# Patient Record
Sex: MALE | Race: WHITE | NOT HISPANIC OR LATINO | Employment: OTHER | URBAN - METROPOLITAN AREA
[De-identification: names, ages, dates, MRNs, and addresses within clinical notes are randomized per-mention and may not be internally consistent; named-entity substitution may affect disease eponyms.]

---

## 2017-01-12 ENCOUNTER — TRANSFERRED RECORDS (OUTPATIENT)
Dept: HEALTH INFORMATION MANAGEMENT | Facility: CLINIC | Age: 27
End: 2017-01-12

## 2017-10-01 ENCOUNTER — TRANSFERRED RECORDS (OUTPATIENT)
Dept: HEALTH INFORMATION MANAGEMENT | Facility: CLINIC | Age: 27
End: 2017-10-01

## 2017-10-02 ENCOUNTER — TELEPHONE (OUTPATIENT)
Dept: GASTROENTEROLOGY | Facility: CLINIC | Age: 27
End: 2017-10-02

## 2017-10-02 NOTE — TELEPHONE ENCOUNTER
[facility] Call Center    Phone Message    Name of Caller: Abi    Phone Number: Cell number on file:    Telephone Information:        or Other phone number: [phone]    Best time to return call: ANY    May a detailed message be left on voicemail: yes    Relation to patient: Mother    Reason for Call: Other: Patient's mother called nad wanted to know which Dr. Dr. Stewart wanted patient to transition     Action Taken: Message routed to:  Pediatric Clinics: Gastroenterology (GI) p [id number]

## 2017-10-02 NOTE — TELEPHONE ENCOUNTER
Patient's mother was called back and informed that Dr. Stewart recommends Dr. Yossi Adkins #[phone].  Patient's mother verbalized understanding.  David Timmons RN

## 2017-11-01 ENCOUNTER — TRANSFERRED RECORDS (OUTPATIENT)
Dept: HEALTH INFORMATION MANAGEMENT | Facility: CLINIC | Age: 27
End: 2017-11-01

## 2018-06-18 ENCOUNTER — TELEPHONE (OUTPATIENT)
Dept: GASTROENTEROLOGY | Facility: CLINIC | Age: 28
End: 2018-06-18

## 2018-06-18 NOTE — TELEPHONE ENCOUNTER
[facility] Call Center    Phone Message: Abi  Phone: [phone]    May a detailed message be left on voicemail: yes    Reason for Call: Abi called and said Dr. Stewart had recommended GI Provider and is hoping to get the name the Provider for Manny.  Please advise.  Thank you.     Action Taken: Message routed to:  Pediatric Clinics: Gastroenterology (GI) p [id number]

## 2018-06-18 NOTE — TELEPHONE ENCOUNTER
Patient's mother was called back and provided scheduling line for Dr. Yossi Adkins at the [facility].  David Timmons RN

## 2018-08-22 ENCOUNTER — TELEPHONE (OUTPATIENT)
Dept: GASTROENTEROLOGY | Facility: CLINIC | Age: 28
End: 2018-08-22

## 2018-08-22 DIAGNOSIS — K51.80 OTHER ULCERATIVE COLITIS WITHOUT COMPLICATION (H): Primary | ICD-10-CM

## 2018-08-22 NOTE — TELEPHONE ENCOUNTER
Patient's mom reports patient has recently moved back to [place] from [place]. Is due for Remicade infusion in the next 1-2 weeks. Had previously been followed by Dr. Kavin Minor of [facility] in [place], [place]. Patient would like to do infusion at [facility] because they have weekend options; however, mom reports she was told that they do not take orders from out of state. She asked if Dr. Stewart would be willing to co-sign orders.     Discussed with Dr. Stewart who is willing to co-sign. Mom is working to get SAVANNA from patient sent to Dr. Minor's office. Plan to have recent clinic notes and Remicade infusion orders faxed here.

## 2018-08-28 NOTE — TELEPHONE ENCOUNTER
Clinic has only received an office visit note from Dr. Minor dated 11/1/17 and are still in need of the actual Remicade orders.  Patient's mother was called back and voicemail was left informing her that Remicade orders are still missing.  Patient's mother was instructed to have [facility] fax Remicade orders and if there have been any other recent office visits.  Direct clinic fax and phone number was provided.  David Timmons RN

## 2018-08-28 NOTE — TELEPHONE ENCOUNTER
Mom is calling to follow up on if records from Dr. Minor's office have arrived. Please advise. [phone]

## 2018-08-29 NOTE — TELEPHONE ENCOUNTER
[facility] Call Center    Phone Message    May a detailed message be left on voicemail: yes    Reason for Call: Other: Patients mother is following up on the status if Remicade orders were received. They were faxed yesterday at #: [phone]. Please advise.     Action Taken: Message routed to:  Pediatric Clinics: Gastroenterology (GI) p [phone]

## 2018-08-29 NOTE — TELEPHONE ENCOUNTER
Patient's mother was called back and informed that Remicade orders have not yet been received via fax.  Patient's mother will plan to call Dr. Minor's clinic again and suggest that the clinic call [facility] directly after orders are faxed to confirm receipt.  Direct clinic contact information was provided.  David Timmons RN

## 2018-09-05 VITALS — BODY MASS INDEX: 24.65 KG/M2 | WEIGHT: 218.8 LBS

## 2018-09-05 PROBLEM — K51.80 OTHER ULCERATIVE COLITIS WITHOUT COMPLICATION (H): Status: ACTIVE | Noted: 2018-09-05

## 2018-09-05 RX ORDER — ACETAMINOPHEN 325 MG/1
650 TABLET ORAL ONCE
Status: CANCELLED
Start: 2018-09-05 | End: 2018-09-05

## 2018-09-05 RX ORDER — DIPHENHYDRAMINE HCL 25 MG
25 CAPSULE ORAL ONCE
Status: CANCELLED
Start: 2018-09-05 | End: 2018-09-05

## 2018-09-05 NOTE — TELEPHONE ENCOUNTER
Patient's Remicade orders from [facility] were received via fax.  Remicade Therapy Plan was placed as instructed by Dr. Stewart via VORB to last until patient's consult with Dr. Adkins in October.  Patient's mother was called and updated.  She will contact patient and they will plan to schedule infusion at [facility].  Patient/mother will call back with other questions.  This RN reinforced that if needed, they should verify coverage with insurance since patient is changing infusion sites from [place] back to [place].  David Timmons RN

## 2018-09-15 ENCOUNTER — INFUSION THERAPY VISIT (OUTPATIENT)
Dept: INFUSION THERAPY | Facility: CLINIC | Age: 28
End: 2018-09-15
Attending: PEDIATRICS
Payer: COMMERCIAL

## 2018-09-15 ENCOUNTER — HOSPITAL ENCOUNTER (OUTPATIENT)
Facility: CLINIC | Age: 28
Setting detail: SPECIMEN
Discharge: HOME | End: 2018-09-15
Attending: PEDIATRICS | Admitting: PEDIATRICS
Payer: COMMERCIAL

## 2018-09-15 VITALS
BODY MASS INDEX: 27.35 KG/M2 | WEIGHT: 242.8 LBS | DIASTOLIC BLOOD PRESSURE: 68 MMHG | TEMPERATURE: 97.7 F | HEART RATE: 60 BPM | SYSTOLIC BLOOD PRESSURE: 118 MMHG | RESPIRATION RATE: 16 BRPM | OXYGEN SATURATION: 97 %

## 2018-09-15 DIAGNOSIS — K51.80 OTHER ULCERATIVE COLITIS WITHOUT COMPLICATION (H): Primary | ICD-10-CM

## 2018-09-15 LAB
ALBUMIN SERPL-MCNC: 3.7 G/DL (ref 3.4–5)
ALP SERPL-CCNC: 57 U/L (ref 40–150)
ALT SERPL W P-5'-P-CCNC: 46 U/L (ref 0–70)
AST SERPL W P-5'-P-CCNC: 31 U/L (ref 0–45)
BASOPHILS # BLD AUTO: 0 10E9/L (ref 0–0.2)
BASOPHILS NFR BLD AUTO: 0.2 %
BILIRUB DIRECT SERPL-MCNC: 0.1 MG/DL (ref 0–0.2)
BILIRUB SERPL-MCNC: 0.5 MG/DL (ref 0.2–1.3)
CRP SERPL-MCNC: <2.9 MG/L (ref 0–8)
DIFFERENTIAL METHOD BLD: NORMAL
EOSINOPHIL # BLD AUTO: 0 10E9/L (ref 0–0.7)
EOSINOPHIL NFR BLD AUTO: 0.3 %
ERYTHROCYTE [DISTWIDTH] IN BLOOD BY AUTOMATED COUNT: 12.6 % (ref 10–15)
ERYTHROCYTE [SEDIMENTATION RATE] IN BLOOD BY WESTERGREN METHOD: 5 MM/H (ref 0–15)
HCT VFR BLD AUTO: 41.1 % (ref 40–53)
HGB BLD-MCNC: 14.4 G/DL (ref 13.3–17.7)
IMM GRANULOCYTES # BLD: 0 10E9/L (ref 0–0.4)
IMM GRANULOCYTES NFR BLD: 0.3 %
LYMPHOCYTES # BLD AUTO: 1.1 10E9/L (ref 0.8–5.3)
LYMPHOCYTES NFR BLD AUTO: 18.1 %
MCH RBC QN AUTO: 31.5 PG (ref 26.5–33)
MCHC RBC AUTO-ENTMCNC: 35 G/DL (ref 31.5–36.5)
MCV RBC AUTO: 90 FL (ref 78–100)
MONOCYTES # BLD AUTO: 0.7 10E9/L (ref 0–1.3)
MONOCYTES NFR BLD AUTO: 10.7 %
NEUTROPHILS # BLD AUTO: 4.4 10E9/L (ref 1.6–8.3)
NEUTROPHILS NFR BLD AUTO: 70.4 %
NRBC # BLD AUTO: 0 10*3/UL
NRBC BLD AUTO-RTO: 0 /100
PLATELET # BLD AUTO: 175 10E9/L (ref 150–450)
PROT SERPL-MCNC: 7.6 G/DL (ref 6.8–8.8)
RBC # BLD AUTO: 4.57 10E12/L (ref 4.4–5.9)
WBC # BLD AUTO: 6.2 10E9/L (ref 4–11)

## 2018-09-15 PROCEDURE — 96415 CHEMO IV INFUSION ADDL HR: CPT

## 2018-09-15 PROCEDURE — 86140 C-REACTIVE PROTEIN: CPT | Performed by: PEDIATRICS

## 2018-09-15 PROCEDURE — 96413 CHEMO IV INFUSION 1 HR: CPT

## 2018-09-15 PROCEDURE — 25000128 H RX IP 250 OP 636: Performed by: PEDIATRICS

## 2018-09-15 PROCEDURE — 80076 HEPATIC FUNCTION PANEL: CPT | Performed by: PEDIATRICS

## 2018-09-15 PROCEDURE — 25000132 ZZH RX MED GY IP 250 OP 250 PS 637: Performed by: PEDIATRICS

## 2018-09-15 PROCEDURE — 85025 COMPLETE CBC W/AUTO DIFF WBC: CPT | Performed by: PEDIATRICS

## 2018-09-15 PROCEDURE — 85652 RBC SED RATE AUTOMATED: CPT | Performed by: PEDIATRICS

## 2018-09-15 RX ORDER — DIPHENHYDRAMINE HCL 25 MG
25 CAPSULE ORAL ONCE
Status: COMPLETED | OUTPATIENT
Start: 2018-09-15 | End: 2018-09-15

## 2018-09-15 RX ORDER — ACETAMINOPHEN 325 MG/1
650 TABLET ORAL ONCE
Status: COMPLETED | OUTPATIENT
Start: 2018-09-15 | End: 2018-09-15

## 2018-09-15 RX ORDER — DIPHENHYDRAMINE HCL 25 MG
25 CAPSULE ORAL ONCE
Status: CANCELLED
Start: 2018-09-15 | End: 2018-09-15

## 2018-09-15 RX ORDER — ACETAMINOPHEN 325 MG/1
650 TABLET ORAL ONCE
Status: CANCELLED
Start: 2018-09-15 | End: 2018-09-15

## 2018-09-15 RX ADMIN — ACETAMINOPHEN 650 MG: 325 TABLET ORAL at 08:50

## 2018-09-15 RX ADMIN — DIPHENHYDRAMINE HYDROCHLORIDE 25 MG: 25 CAPSULE ORAL at 08:50

## 2018-09-15 RX ADMIN — INFLIXIMAB 500 MG: 100 INJECTION, POWDER, LYOPHILIZED, FOR SOLUTION INTRAVENOUS at 09:10

## 2018-09-15 ASSESSMENT — PAIN SCALES - GENERAL: PAINLEVEL: NO PAIN (0)

## 2018-09-15 NOTE — LETTER
[address] [place] [address]      Parent of Noam Eugene  [address]  [place] [address]        :  1990  MRN:  [id number]    Dear Parent of Noam,    This letter is to report the results of your child's most recent visit/procedure.    The results are satisfactory unless described below.    Results for orders placed or performed in visit on 09/15/18   CBC with platelets differential   Result Value Ref Range    WBC 6.2 4.0 - 11.0 10e9/L    RBC Count 4.57 4.4 - 5.9 10e12/L    Hemoglobin 14.4 13.3 - 17.7 g/dL    Hematocrit 41.1 40.0 - 53.0 %    MCV 90 78 - 100 fl    MCH 31.5 26.5 - 33.0 pg    MCHC 35.0 31.5 - 36.5 g/dL    RDW 12.6 10.0 - 15.0 %    Platelet Count 175 150 - 450 10e9/L    Diff Method Automated Method     % Neutrophils 70.4 %    % Lymphocytes 18.1 %    % Monocytes 10.7 %    % Eosinophils 0.3 %    % Basophils 0.2 %    % Immature Granulocytes 0.3 %    Nucleated RBCs 0 0 /100    Absolute Neutrophil 4.4 1.6 - 8.3 10e9/L    Absolute Lymphocytes 1.1 0.8 - 5.3 10e9/L    Absolute Monocytes 0.7 0.0 - 1.3 10e9/L    Absolute Eosinophils 0.0 0.0 - 0.7 10e9/L    Absolute Basophils 0.0 0.0 - 0.2 10e9/L    Abs Immature Granulocytes 0.0 0 - 0.4 10e9/L    Absolute Nucleated RBC 0.0    Erythrocyte sedimentation rate auto   Result Value Ref Range    Sed Rate 5 0 - 15 mm/h   CRP inflammation   Result Value Ref Range    CRP Inflammation <2.9 0.0 - 8.0 mg/L   Hepatic panel   Result Value Ref Range    Bilirubin Direct 0.1 0.0 - 0.2 mg/dL    Bilirubin Total 0.5 0.2 - 1.3 mg/dL    Albumin 3.7 3.4 - 5.0 g/dL    Protein Total 7.6 6.8 - 8.8 g/dL    Alkaline Phosphatase 57 40 - 150 U/L    ALT 46 0 - 70 U/L    AST 31 0 - 45 U/L         Thank you for allowing me to participate in [facility].   If you have any questions, please contact the nurse line [phone].      Sincerely,    Julien Stewart MD  Pediatric Gastroeneterology    CC  Patient Care  Team:    Monica Pop MD  [facility] [address] [address]          Enriqueta Dietz MD

## 2018-09-15 NOTE — PROGRESS NOTES
"Infusion Nursing Note:  Noam Eugene presents today for Remicade.    Patient seen by provider today: No   present during visit today: Not Applicable.    Note: Patient recently moved back to [place] from [place], has been receiving his remicade infusions there. No new concerns today.    Intravenous Access:  Peripheral IV placed.  Labs drawn without difficulty    Treatment Conditions:  Rheumatology Infusion Checklist: PRIOR TO INFUSION OF BIOLOGICAL MEDICATIONS OR ANY OF THESE AS LISTED: Remicaide (infliximab) \".rheumbiologicalchecklist\"    Prior to Infusion of biological medications or any of these as listed:    1. Elevated temperature, fever, chills, productive cough or abnormal vital signs, night sweats, coughing up blood or sputum, no appetite or abnormal vital signs : NO    2. Open wounds or new incisions: NO    3. Recent hospitalization: NO    4.  Recent surgeries:  NO    5. Any upcoming surgeries or dental procedures?:NO    6. Any current or recent bouts of illness or infection? On any antibiotics? : NO    7. Any new, sudden or worsening abdominal pain :NO    8. Vaccination within 4 weeks? Patient or someone in the household is scheduled to receive vaccination? No live virus vaccines prior to or during treatment :NO    9. Any nervous system diseases [i.e. multiple sclerosis, Guillain-Marianna, seizures, neurological  changes]: NO    10. Pregnant or breast feeding; or plans on pregnancy in the future: N/A    11. Signs of worsening depression or suicidal ideations while taking benlysta:N/A    12. New-onset medical symptoms: NO    13.  New cancer diagnosis or on chemotherapy or radiation NO    14.  Evaluate for any sign of active TB [Unexplained weight loss, Loss of appetite, Night sweats, Fever, Fatigue, Chills, Coughing for 3 weeks or longer, Hemoptysis (coughing up blood), Chest pain]: NO    **Note: If answered yes to any of the above, hold the infusion and contact ordering rheumatologist or on-call " rheumatologist.     Post Infusion Assessment:  Patient tolerated infusion without incident.  Site patent and intact, free from redness, edema or discomfort.  No evidence of extravasations.  Access discontinued per protocol.    Discharge Plan:   Discharge instructions reviewed with: Patient.  Patient and/or family verbalized understanding of discharge instructions and all questions answered.  Copy of AVS reviewed with patient and/or family.  Patient will return in 8 weeks for next appointment.  Patient discharged in stable condition accompanied by: self.  Departure Mode: Ambulatory.    MAYTE Chiang RN

## 2018-09-15 NOTE — MR AVS SNAPSHOT
"              After Visit Summary   9/15/2018    Noam Eugene    MRN: [id number]           Patient Information     Date Of Birth          1990        Visit Information        Provider Department      9/15/2018 9:00 AM  INFUSION CHAIR 5 [facility]        Today's Diagnoses     Other ulcerative colitis without complication (H)    -  1       Follow-ups after your visit        Your next 10 appointments already scheduled     Oct 22, 2018  4:20 PM CDT   (Arrive by 4:05 PM)   INFLAMMATORY BOWEL DISEASE with Yossi Adkins MD   [facility] ([facility])    [address] [place] [address]  [address]th [facility] [address] [phone]              Who to contact     If you have questions or need follow up information about today's clinic visit or your schedule please contact [facility] directly at [phone].  Normal or non-critical lab and imaging results will be communicated to you by MyChart, letter or phone within 4 business days after the clinic has received the results. If you do not hear from us within 7 days, please contact the clinic through [email or web address] or phone. If you have a critical or abnormal lab result, we will notify you by phone as soon as possible.  Submit refill requests through [employer] or call your pharmacy and they will forward the refill request to us. Please allow 3 business days for your refill to be completed.          Additional Information About Your Visit        [email or web address] Information     [employer] lets you send messages to your doctor, view your test results, renew your prescriptions, schedule appointments and more. To sign up, go to [email or web address] . Click on \"Log in\" on the left side of the screen, which will take you to the Welcome page. Then click on \"Sign up Now\" on the right side of the page.     You will be asked to enter the access code listed below, as " well as some personal information. Please follow the directions to create your username and password.     Your access code is: [email or web address]  Expires: 2018 11:15 AM     Your access code will  in 90 days. If you need help or a new code, please call your [facility] clinic or [phone].        Care EveryWhere ID     This is your Care EveryWhere ID. This could be used by other organizations to access your [facility] medical records  [id number]        Your Vitals Were     Pulse Temperature Respirations Pulse Oximetry BMI (Body Mass Index)       60 97.7  F (36.5  C) (Oral) 16 97% 27.35 kg/m2        Blood Pressure from Last 3 Encounters:   09/15/18 118/68   16 131/59   16 133/59    Weight from Last 3 Encounters:   09/15/18 110.1 kg (242 lb 12.8 oz)   17 99.2 kg (218 lb 12.8 oz)   16 104 kg (229 lb 4.5 oz)              We Performed the Following     CBC with platelets differential     CRP inflammation     Erythrocyte sedimentation rate auto     Hepatic panel        Primary Care Provider Office Phone # Fax #    Monica Pop [phone]       [facility] [place] [address] [address]        Equal Access to Services     [facility] Hospital[facility]AYCEE : Hadii aad ku hadasho Soomaali, waaxda luqadaha, qaybta kaalmada adeegyada, avinash ham haydonald quick . So [facility] [phone].    ATENCIÓN: Si habla español, tiene a chi disposición servicios gratuitos de asistencia lingüística. neymar al [phone].    We comply with applicable federal civil rights laws and [place] laws. We do not discriminate on the basis of race, color, national origin, age, disability, sex, sexual orientation, or gender identity.            Thank you!     Thank you for choosing [facility]  for your care. Our goal is always to provide you with excellent care. Hearing back from our patients is one way we can continue to improve our services.  Please take a few minutes to complete the written survey that you may receive in the mail after your visit with us. Thank you!             Your Updated Medication List - Protect others around you: Learn how to safely use, store and throw away your medicines at www.disposemymeds.org.          This list is accurate as of 9/15/18 11:15 AM.  Always use your most recent med list.                   Brand Name Dispense Instructions for use Diagnosis    REMICADE 100 MG injection   Generic drug:  inFLIXimab      Inject 1,000 mg into the vein Every 8 weeks

## 2018-09-19 ENCOUNTER — CARE COORDINATION (OUTPATIENT)
Dept: GASTROENTEROLOGY | Facility: CLINIC | Age: 28
End: 2018-09-19

## 2018-09-19 NOTE — PROGRESS NOTES
Called pt and offered pt an appt with Dr. Adkins on September 24 at 140. Pt given address, parking and check in time.

## 2018-09-21 ENCOUNTER — TELEPHONE (OUTPATIENT)
Dept: GASTROENTEROLOGY | Facility: CLINIC | Age: 28
End: 2018-09-21

## 2018-09-21 NOTE — TELEPHONE ENCOUNTER
Left voice message for pt confirming apt on 9/24/18 @1:40pm with Dr. Adkins GI clinic. Patient to call [phone] to reschedule as needed.    AMMON Andrade

## 2018-09-24 ENCOUNTER — OFFICE VISIT (OUTPATIENT)
Dept: GASTROENTEROLOGY | Facility: CLINIC | Age: 28
End: 2018-09-24
Payer: COMMERCIAL

## 2018-09-24 VITALS
HEIGHT: 78 IN | SYSTOLIC BLOOD PRESSURE: 134 MMHG | HEART RATE: 48 BPM | OXYGEN SATURATION: 100 % | WEIGHT: 246.1 LBS | BODY MASS INDEX: 28.47 KG/M2 | DIASTOLIC BLOOD PRESSURE: 72 MMHG | TEMPERATURE: 97.4 F

## 2018-09-24 DIAGNOSIS — K51.00 ULCERATIVE PANCOLITIS WITHOUT COMPLICATION (H): Primary | ICD-10-CM

## 2018-09-24 ASSESSMENT — PAIN SCALES - GENERAL: PAINLEVEL: NO PAIN (0)

## 2018-09-24 NOTE — PROGRESS NOTES
IBD CLINIC VISIT     CC/REFERRING MD:  Monica Pop   REASON FOR CONSULTATION: Ulcerative colitis    IBD HISTORY  Age at diagnosis: 19 (3/2010)  Extent of disease: Pancolitis (e4)  Current UC medications:   - Infliximab 5mg/kg, started summer 2012  Prior UC surgeries:  Prior IBD Medications:  - Sulfasalazine - worsening of symptoms  - Asacol - No symptomatic improvement   - 6MP - Severe flare requiring prednisone.     DRUG MONITORING  TPMT enzyme activity: Intermediate   IBD7: pANCA positive (3/22/2010)    6-TGN/6-MMPN levels:    Biologic concentration:    DISEASE ASSESSMENT  Labs:  Recent Labs   Lab Test  09/15/18   0856  11/27/16   0900   CRP  <2.9  6.6   SED  5  5     Endoscopic assessment: eMayo 0, 2013  Enterography: --  Fecal calprotectin: --  C diff: --  pMayo score: 0/9    ASSESSMENT/PLAN  This is a 28-year-old male with ulcerative pancolitis    1.  Ulcerative colitis: Clinically in remission.  Endoscopic exam from 2013 demonstrated endoscopic remission with minimal histologic activity.  Had an in-depth discussion about de-escalation of medications at patient's request today.  Discussed there was some role for de-escalating anti-TNF therapy.  Would first want to reevaluate endoscopically and obtain a Remicade trough concentration.  Discussed the risk of Remicade including infections and lymphoma and skin cancer.  Discussed the risk of flaring in the setting of therapy de-escalation.  He is also interested in meeting with a nutritionist to learn about nutritional therapy for IBD.  I did suggest he could consider curcumin as a supplement to 1.5-3 g a day.  -- Continue Infliximab at current dose (5mg/kg) every 8 weeks.   -- Infliximab trough concentration with next infusion  -- Colonoscopy for surveillance and re-staging.   -- Nutrition evaluation today  -- Yearly LFTs to screen for PSC (up to date)  -- CBC and LFTs and CRP every other Infliximab infusion (3-4 times a year).  -- Quant gold with next  remicade infusion      2.  Possible hidradenitis suppurativa : Noted on outside hospital gastroenterology report.  Unclear if it was true HS, or old scarring.  No active disease today we will continue to monitor.    IBD Health Care Maintenance:    Vaccinations:  All patients on biologics should avoid live vaccines.    -- Influenza (every year)  -- TdaP (every 10 years)  -- Pneumococcal Pneumonia (once plus booster at 5 years)  -- Yearly assessment for latent Tb (verbal screening and exam, PPD or QuantiFERON-Tb testing):   PPD negative 8/11/2010    One time confirmation of immunity or serologies:  -- Hepatitis A (serologies or immunizations)  -- Hepatitis B (serologies or immunizations)  -- Varicella  -- MMR  -- HPV (all aged 18-26)  -- Meningococcal meningitis (all patients at risk for meningitis)-- Due to the immunosuppression in this patient, I would not advise administration of live vaccines such as varicella/VZV, intranasal influenza, MMR, or yellow fever vaccine (if travelling).      Bone mineral density screening   -- Recommend all patients supplement with calcium and vitamin D    Cancer Screening:  Colon cancer screening:  Given pancolitis, recommend patient undergo regular dysplasia surveillance.    Skin cancer screening: Annual visual exam of skin by dermatologist since patient is immunocompromised    Depression Screening:  -- Over the last month, have you felt down, depressed, or hopeless? --  -- Over the last month, have you felt little interest or pleasure doing things? --    Misc:  -- Avoid tobacco use  -- Avoid NSAIDs as there is potentially a 25% chance of causing an IBD flare    Return to clinic in 4-6 months    Thank you for this consultation.  It was a pleasure to participate in the care of this patient; please contact us with any further questions.      This note was created with voice recognition software, and while reviewed for accuracy, typos may remain.     Yossi Adkins MD  Assistant  Professor of Medicine  Inflammatory Bowel Disease Program   Division of Gastroenterology, Hepatology and Nutrition  [facility]          HPI:   This is a 28-year-old male who is here today to establish care in the adult IBD program.  He was.  Previously followed with pediatric inflammatory bowel disease program, then moved to [place] for a few years for work and is now back in the [facility].  He was diagnosed in 2010 with ulcerative pancolitis.  Initially started on sulfasalazine and mesalamine with poor disease control.  He had frequent flares requiring prednisone.  He then did well on 6-MP 25 mg a day (he was heterozygote for TPN T enzyme activity).  However eventually flared and required prednisone.  He was then transitioned to infliximab in 2012.    No flares since starting Infliximab.    Currently:   Has 1-2 stools a day, mostly formed. Diet dependant.  No blood.   No abdominal pain.   No nausea, emesis constipation    With last Infliximab infusion - he had some head pressure. Lasted for about 1 minute. Self resolved. This was a faster infusion (2 hours).      A few years ago, was started to get some symptoms at the end of his 8 week infusion, so went to every 6 weeks.  At some point, his dose was increased and he was placed back on every 8 week infusions.     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     Interested in meeting with Nutrition.    Interested in potentially de-escalating therapy at some point.      ROS:    No fevers or chills  No weight loss  No blurry vision, double vision or change in vision  No sore throat  No lymphadenopathy  No headache, paraesthesias, or weakness in a limb  No shortness of breath or wheezing  No chest pain or pressure  No arthralgias or myalgias  No rashes or skin changes  No odynophagia or dysphagia  No BRBPR, hematochezia, melena  No dysuria, frequency or urgency  No hot/cold intolerance or polyria  No anxiety or  depression    Extra intestinal manifestations of IBD:  No uveitis/episcleritis  No aphthous ulcers   No arthritis   No erythema nodosum/pyoderma gangrenosum.     PERTINENT PAST MEDICAL HISTORY:  Past Medical History:   Diagnosis Date     Ulcerative colitis (H)        PREVIOUS SURGERIES:  Past Surgical History:   Procedure Laterality Date     COLONOSCOPY  7/10/2013    Procedure: COMBINED COLONOSCOPY, SINGLE BIOPSY/POLYPECTOMY BY BIOPSY;;  Surgeon: Julien Stewart MD;  Location: [facility] OR     ESOPHAGOSCOPY, GASTROSCOPY, DUODENOSCOPY (EGD), COMBINED  7/10/2013    Procedure: COMBINED ESOPHAGOSCOPY, GASTROSCOPY, DUODENOSCOPY (EGD), BIOPSY SINGLE OR MULTIPLE;  Colonoscopy, EGD, ulcerative colitis;  Surgeon: Julien Stewart MD;  Location: [facility] OR       PREVIOUS ENDOSCOPY:  2013: Normal colon; path with mild to minimal inflammation.     ALLERGIES:   No Known Allergies    PERTINENT MEDICATIONS:    Current Outpatient Prescriptions:      inFLIXimab (REMICADE) 100 MG injection, Inject 1,000 mg into the vein Every 8 weeks, Disp: , Rfl:     SOCIAL HISTORY:  Social History     Social History     Marital status: Single     Spouse name: N/A     Number of children: N/A     Years of education: N/A     Occupational History     Not on file.     Social History Main Topics     Smoking status: Never Smoker     Smokeless tobacco: Never Used     Alcohol use Not on file     Drug use: Not on file     Sexual activity: Not on file     Other Topics Concern     Not on file     Social History Narrative     No narrative on file       FAMILY HISTORY:  Family History   Problem Relation Age of Onset     Hypertension Father      Colon Cancer No family hx of        Past/family/social history reviewed and no changes    PHYSICAL EXAMINATION:  Constitutional: aaox3, cooperative, pleasant, not dyspneic/diaphoretic, no acute distress  Vitals reviewed: There were no vitals taken for this visit.  Wt:   Wt Readings from Last 2 Encounters:   09/15/18 110.1 kg (242 lb 12.8  oz)   11/01/17 99.2 kg (218 lb 12.8 oz)      Eyes: Sclera anicteric/injected  Ears/nose/mouth/throat: Normal oropharynx without ulcers or exudate, mucus membranes moist, hearing intact  Neck: supple, thyroid normal size  CV: No edema  Respiratory: Unlabored breathing  Lymph: No axillary, submandibular, supraclavicular or inguinal lymphadenopathy  Abd: Nondistended, +bs, no hepatosplenomegaly, nontender, no peritoneal signs  Skin: warm, perfused, no jaundice  Psych: Normal affect  MSK: Normal gait      PERTINENT STUDIES:  Most recent CBC:  Recent Labs   Lab Test  09/15/18   0856  11/27/16   0900   WBC  6.2  7.5   HGB  14.4  15.5   HCT  41.1  44.1   PLT  175  196     Most recent hepatic panel:  Recent Labs   Lab Test  09/15/18   0856  11/27/16   0900   ALT  46  36   AST  31  23     Most recent creatinine:  Recent Labs   Lab Test  07/01/14   1406  03/07/12   1152   CR  1.24  0.89         Answers for HPI/ROS submitted by the patient on 9/24/2018   General Symptoms: No  Skin Symptoms: No  HENT Symptoms: No  EYE SYMPTOMS: No  HEART SYMPTOMS: No  LUNG SYMPTOMS: No  INTESTINAL SYMPTOMS: No  URINARY SYMPTOMS: No  REPRODUCTIVE SYMPTOMS: No  SKELETAL SYMPTOMS: No  BLOOD SYMPTOMS: No  NERVOUS SYSTEM SYMPTOMS: No  MENTAL HEALTH SYMPTOMS: No  PHQ-2 Score: 1

## 2018-09-24 NOTE — NURSING NOTE
"No chief complaint on file.      Vitals:    09/24/18 1350   BP: 134/72   Pulse: (!) 48   Temp: 97.4  F (36.3  C)   TempSrc: Oral   SpO2: 100%   Weight: 111.6 kg (246 lb 1.6 oz)   Height: 2.007 m (6' 7\")       Body mass index is 27.72 kg/(m^2).    Wendi GREEN LPN                  "

## 2018-09-24 NOTE — MR AVS SNAPSHOT
After Visit Summary   9/24/2018    Noam Eugene    MRN: [id number]           Patient Information     Date Of Birth          1990        Visit Information        Provider Department      9/24/2018 1:40 PM Yossi Adkins MD [facility] Gastroenterology and IBD Clinic        Today's Diagnoses     Ulcerative pancolitis without complication (H)    -  1      Care Instructions    It was a pleasure taking care of you today.  I've included a brief summary of our discussion and care plan from today's visit below.  Please review this information with your primary care provider.  _______________________________________________________________________    My recommendations are summarized as follows:    --  Continue with Remicade at current dose    --  Remicade level with next infusion    --  Colonoscopy  You are scheduled on October 22     Check in time is 10    [facility]   [address] [place] [address]    You will be mailed the instructions   You will receive a pre assessment nurse about one week prior to exam         --  Dermatologist for skin exam on remicade  [phone]    Return to GI Clinic in 4-6 months to review your progress.    _______________________________________________________________________    Who do I call with any questions after my visit?  Please be in touch if there are any further questions that arise following today's visit.  There are multiple ways to contact your gastroenterology care team.        During business hours, you may reach a Gastroenterology nurse at [phone].      To schedule or reschedule an appointment, please call [phone].       You can always send a secure message through [employer].  [employer] messages are answered by your nurse or doctor typically within 24 hours.  Please allow extra time on weekends and holidays.        For urgent/emergent questions after business hours, you may reach the on-call GI Fellow by contacting the [facility]   at [phone].     How will I get the results of any tests ordered?    You will receive all of your results.  If you have signed up for [employer], any tests ordered at your visit will be available to you after your physician reviews them.  Typically this takes 1-2 weeks.  If there are urgent results that require a change in your care plan, your physician or nurse will call you to discuss the next steps.      What is [employer]?  [employer] is a secure way for you to access all of your healthcare records from the [facility].  It is a web based computer program, so you can sign on to it from any location.  It also allows you to send secure messages to your care team.  I recommend signing up for [employer] access if you have not already done so and are comfortable with using a computer.      How to I schedule a follow-up visit?  If you did not schedule a follow-up visit today, please call [phone] to schedule a follow-up office visit.        Sincerely,    Yossi Adkins MD    [facility]  Division of Gastroenterology         Thanks Mercedes Lynch RN Care Coordinator for Dr. Adkins  Phone   [phone]             Follow-ups after your visit        Additional Services     DERMATOLOGY REFERRAL       Your provider has referred you to: FM: [facility] Dermatology [facility] [phone]   [email or web address]    Please be aware that coverage of these services is subject to the terms and limitations of your health insurance plan.  Call member services at your health plan with any benefit or coverage questions.      Please bring the following with you to your appointment:    (1) Any X-Rays, CTs or MRIs which have been performed.  Contact the facility where they were done to arrange for  prior to your scheduled appointment.    (2) List of current medications  (3) This referral request   (4) Any documents/labs given to you for this  "referral            GASTROENTEROLOGY ADULT REF PROCEDURE ONLY                 Your next 10 appointments already scheduled     Oct 29, 2018  7:00 AM CDT   (Arrive by 6:45 AM)   RETURN INFLAMMATORY BOWEL DISEASE with Fili Rutherford PA-C   [facility] Gastroenterology and IBD Clinic ([facility])    [address] [place] [address] [phone]              Future tests that were ordered for you today     Open Future Orders        Priority Expected Expires Ordered    M Tuberculosis by Quantiferon Routine 11/13/2018 1/1/2019 9/24/2018            Who to contact     Please call your clinic at [phone] to:    Ask questions about your health    Make or cancel appointments    Discuss your medicines    Learn about your test results    Speak to your doctor            Additional Information About Your Visit        [employer] Information     [employer] gives you secure access to your electronic health record. If you see a primary care provider, you can also send messages to your care team and make appointments. If you have questions, please call your primary care clinic.  If you do not have a primary care provider, please call [phone] and they will assist you.      [employer] is an electronic gateway that provides easy, online access to your medical records. With [employer], you can request a clinic appointment, read your test results, renew a prescription or communicate with your care team.     To access your existing account, please contact your [facility] or call [phone] for assistance.        Care EveryWhere ID     This is your Care EveryWhere ID. This could be used by other organizations to access your [facility] medical records  [id number]        Your Vitals Were     Pulse Temperature Height Pulse Oximetry BMI (Body Mass Index)       48 97.4  F (36.3  C) (Oral) 2.007 m (6' 7\") 100% 27.72 kg/m2        Blood Pressure from Last 3 Encounters: "   09/24/18 134/72   09/15/18 118/68   11/27/16 131/59    Weight from Last 3 Encounters:   09/24/18 111.6 kg (246 lb 1.6 oz)   09/15/18 110.1 kg (242 lb 12.8 oz)   11/01/17 99.2 kg (218 lb 12.8 oz)              We Performed the Following     DERMATOLOGY REFERRAL     GASTROENTEROLOGY ADULT REF PROCEDURE ONLY        Primary Care Provider Office Phone # Fax #    Monica Pop [phone]       [facility] [place] [id number] [facility] [id number]        Equal Access to Services     [facility]: Hadii aad ku hadasho Sojai, waaxda luqadaha, qaybta kaalmada adebrandonyada, avinash quick . So [facility] [phone].    ATENCIÓN: Si habla español, tiene a chi disposición servicios gratuitos de asistencia lingüística. [facility] [phone].    We comply with applicable federal civil rights laws and [place] laws. We do not discriminate on the basis of race, color, national origin, age, disability, sex, sexual orientation, or gender identity.            Thank you!     Thank you for choosing [facility] GASTROENTEROLOGY AND IBD CLINIC  for your care. Our goal is always to provide you with excellent care. Hearing back from our patients is one way we can continue to improve our services. Please take a few minutes to complete the written survey that you may receive in the mail after your visit with us. Thank you!             Your Updated Medication List - Protect others around you: Learn how to safely use, store and throw away your medicines at www.disposemymeds.org.          This list is accurate as of 9/24/18  3:38 PM.  Always use your most recent med list.                   Brand Name Dispense Instructions for use Diagnosis    REMICADE 100 MG injection   Generic drug:  inFLIXimab      Inject 1,000 mg into the vein Every 8 weeks

## 2018-09-24 NOTE — PATIENT INSTRUCTIONS
It was a pleasure taking care of you today.  I've included a brief summary of our discussion and care plan from today's visit below.  Please review this information with your primary care provider.  _______________________________________________________________________    My recommendations are summarized as follows:    --  Continue with Remicade at current dose    --  Remicade level with next infusion    --  Colonoscopy  You are scheduled on October 22     Check in time is 10    [facility]   [facility]    You will be mailed the instructions   You will receive a pre assessment nurse about one week prior to exam         --  Dermatologist for skin exam on remicade  [phone]    Return to GI Clinic in 4-6 months to review your progress.    _______________________________________________________________________    Who do I call with any questions after my visit?  Please be in touch if there are any further questions that arise following today's visit.  There are multiple ways to contact your gastroenterology care team.        During business hours, you may reach a Gastroenterology nurse at [phone].      To schedule or reschedule an appointment, please call [phone].       You can always send a secure message through [employer].  [employer] messages are answered by your nurse or doctor typically within 24 hours.  Please allow extra time on weekends and holidays.        For urgent/emergent questions after business hours, you may reach the on-call GI Fellow by contacting the [facility]  at [phone].     How will I get the results of any tests ordered?    You will receive all of your results.  If you have signed up for [employer], any tests ordered at your visit will be available to you after your physician reviews them.  Typically this takes 1-2 weeks.  If there are urgent results that require a change in your care plan, your physician or nurse will call you to discuss  the next steps.      What is [employer]?  [employer] is a secure way for you to access all of your healthcare records from the [facility].  It is a web based computer program, so you can sign on to it from any location.  It also allows you to send secure messages to your care team.  I recommend signing up for [employer] access if you have not already done so and are comfortable with using a computer.      How to I schedule a follow-up visit?  If you did not schedule a follow-up visit today, please call [phone] to schedule a follow-up office visit.        Sincerely,    Yossi Adkins MD    [facility]  Division of Gastroenterology         Thanks Mercedes Lynch RN Care Coordinator for Dr. Adkins  Phone   [phone]

## 2018-09-24 NOTE — LETTER
9/24/2018       RE: Noam Eugene  [address]  [place] [address]     Dear Colleague,    Thank you for referring your patient, Noam Eugene, to the [facility] GASTROENTEROLOGY AND IBD CLINIC at [facility]. Please see a copy of my visit note below.    IBD CLINIC VISIT     CC/REFERRING MD:  Monica Pop   REASON FOR CONSULTATION: Ulcerative colitis    IBD HISTORY  Age at diagnosis: 19 (3/2010)  Extent of disease: Pancolitis (e4)  Current UC medications:   - Infliximab 5mg/kg, started summer 2012  Prior UC surgeries:  Prior IBD Medications:  - Sulfasalazine - worsening of symptoms  - Asacol - No symptomatic improvement   - 6MP - Severe flare requiring prednisone.     DRUG MONITORING  TPMT enzyme activity: Intermediate   IBD7: pANCA positive (3/22/2010)    6-TGN/6-MMPN levels:    Biologic concentration:    DISEASE ASSESSMENT  Labs:  Recent Labs   Lab Test  09/15/18   0856  11/27/16   0900   CRP  <2.9  6.6   SED  5  5     Endoscopic assessment: eMayo 0, 2013  Enterography: --  Fecal calprotectin: --  C diff: --  pMayo score: 0/9    ASSESSMENT/PLAN  This is a 28-year-old male with ulcerative pancolitis    1.  Ulcerative colitis: Clinically in remission.  Endoscopic exam from 2013 demonstrated endoscopic remission with minimal histologic activity.  Had an in-depth discussion about de-escalation of medications at patient's request today.  Discussed there was some role for de-escalating anti-TNF therapy.  Would first want to reevaluate endoscopically and obtain a Remicade trough concentration.  Discussed the risk of Remicade including infections and lymphoma and skin cancer.  Discussed the risk of flaring in the setting of therapy de-escalation.  He is also interested in meeting with a nutritionist to learn about nutritional therapy for IBD.  I did suggest he could consider curcumin as a supplement to 1.5-3 g a day.  -- Continue Infliximab at current dose (5mg/kg)  every 8 weeks.   -- Infliximab trough concentration with next infusion  -- Colonoscopy for surveillance and re-staging.   -- Nutrition evaluation today  -- Yearly LFTs to screen for PSC (up to date)  -- CBC and LFTs and CRP every other Infliximab infusion (3-4 times a year).  -- Quant gold with next remicade infusion      2.  Possible hidradenitis suppurativa : Noted on outside hospital gastroenterology report.  Unclear if it was true HS, or old scarring.  No active disease today we will continue to monitor.    IBD Health Care Maintenance:    Vaccinations:  All patients on biologics should avoid live vaccines.    -- Influenza (every year)  -- TdaP (every 10 years)  -- Pneumococcal Pneumonia (once plus booster at 5 years)  -- Yearly assessment for latent Tb (verbal screening and exam, PPD or QuantiFERON-Tb testing):   PPD negative 8/11/2010    One time confirmation of immunity or serologies:  -- Hepatitis A (serologies or immunizations)  -- Hepatitis B (serologies or immunizations)  -- Varicella  -- MMR  -- HPV (all aged 18-26)  -- Meningococcal meningitis (all patients at risk for meningitis)-- Due to the immunosuppression in this patient, I would not advise administration of live vaccines such as varicella/VZV, intranasal influenza, MMR, or yellow fever vaccine (if travelling).      Bone mineral density screening   -- Recommend all patients supplement with calcium and vitamin D    Cancer Screening:  Colon cancer screening:  Given pancolitis, recommend patient undergo regular dysplasia surveillance.    Skin cancer screening: Annual visual exam of skin by dermatologist since patient is immunocompromised    Depression Screening:  -- Over the last month, have you felt down, depressed, or hopeless? --  -- Over the last month, have you felt little interest or pleasure doing things? --    Misc:  -- Avoid tobacco use  -- Avoid NSAIDs as there is potentially a 25% chance of causing an IBD flare    Return to clinic in 4-6  months    Thank you for this consultation.  It was a pleasure to participate in the care of this patient; please contact us with any further questions.      This note was created with voice recognition software, and while reviewed for accuracy, typos may remain.     Yossi Adkins MD   of Medicine  Inflammatory Bowel Disease Program   Division of Gastroenterology, Hepatology and Nutrition  [facility]          HPI:   This is a 28-year-old male who is here today to establish care in the adult IBD program.  He was.  Previously followed with pediatric inflammatory bowel disease program, then moved to [place] for a few years for work and is now back in the [facility].  He was diagnosed in 2010 with ulcerative pancolitis.  Initially started on sulfasalazine and mesalamine with poor disease control.  He had frequent flares requiring prednisone.  He then did well on 6-MP 25 mg a day (he was heterozygote for TPN T enzyme activity).  However eventually flared and required prednisone.  He was then transitioned to infliximab in 2012.    No flares since starting Infliximab.    Currently:   Has 1-2 stools a day, mostly formed. Diet dependant.  No blood.   No abdominal pain.   No nausea, emesis constipation    With last Infliximab infusion - he had some head pressure. Lasted for about 1 minute. Self resolved. This was a faster infusion (2 hours).      A few years ago, was started to get some symptoms at the end of his 8 week infusion, so went to every 6 weeks.  At some point, his dose was increased and he was placed back on every 8 week infusions.     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     Interested in meeting with Nutrition.    Interested in potentially de-escalating therapy at some point.      ROS:    No fevers or chills  No weight loss  No blurry vision, double vision or change in vision  No sore throat  No lymphadenopathy  No headache, paraesthesias,  or weakness in a limb  No shortness of breath or wheezing  No chest pain or pressure  No arthralgias or myalgias  No rashes or skin changes  No odynophagia or dysphagia  No BRBPR, hematochezia, melena  No dysuria, frequency or urgency  No hot/cold intolerance or polyria  No anxiety or depression    Extra intestinal manifestations of IBD:  No uveitis/episcleritis  No aphthous ulcers   No arthritis   No erythema nodosum/pyoderma gangrenosum.     PERTINENT PAST MEDICAL HISTORY:  Past Medical History:   Diagnosis Date     Ulcerative colitis (H)        PREVIOUS SURGERIES:  Past Surgical History:   Procedure Laterality Date     COLONOSCOPY  7/10/2013    Procedure: COMBINED COLONOSCOPY, SINGLE BIOPSY/POLYPECTOMY BY BIOPSY;;  Surgeon: Julien Stewart MD;  Location: [facility] OR     ESOPHAGOSCOPY, GASTROSCOPY, DUODENOSCOPY (EGD), COMBINED  7/10/2013    Procedure: COMBINED ESOPHAGOSCOPY, GASTROSCOPY, DUODENOSCOPY (EGD), BIOPSY SINGLE OR MULTIPLE;  Colonoscopy, EGD, ulcerative colitis;  Surgeon: Julien Stewart MD;  Location: [facility] OR       PREVIOUS ENDOSCOPY:  2013: Normal colon; path with mild to minimal inflammation.     ALLERGIES:   No Known Allergies    PERTINENT MEDICATIONS:    Current Outpatient Prescriptions:      inFLIXimab (REMICADE) 100 MG injection, Inject 1,000 mg into the vein Every 8 weeks, Disp: , Rfl:     SOCIAL HISTORY:  Social History     Social History     Marital status: Single     Spouse name: N/A     Number of children: N/A     Years of education: N/A     Occupational History     Not on file.     Social History Main Topics     Smoking status: Never Smoker     Smokeless tobacco: Never Used     Alcohol use Not on file     Drug use: Not on file     Sexual activity: Not on file     Other Topics Concern     Not on file     Social History Narrative     No narrative on file       FAMILY HISTORY:  Family History   Problem Relation Age of Onset     Hypertension Father      Colon Cancer No family hx of        Past/family/social  history reviewed and no changes    PHYSICAL EXAMINATION:  Constitutional: aaox3, cooperative, pleasant, not dyspneic/diaphoretic, no acute distress  Vitals reviewed: There were no vitals taken for this visit.  Wt:   Wt Readings from Last 2 Encounters:   09/15/18 110.1 kg (242 lb 12.8 oz)   11/01/17 99.2 kg (218 lb 12.8 oz)      Eyes: Sclera anicteric/injected  Ears/nose/mouth/throat: Normal oropharynx without ulcers or exudate, mucus membranes moist, hearing intact  Neck: supple, thyroid normal size  CV: No edema  Respiratory: Unlabored breathing  Lymph: No axillary, submandibular, supraclavicular or inguinal lymphadenopathy  Abd: Nondistended, +bs, no hepatosplenomegaly, nontender, no peritoneal signs  Skin: warm, perfused, no jaundice  Psych: Normal affect  MSK: Normal gait      PERTINENT STUDIES:  Most recent CBC:  Recent Labs   Lab Test  09/15/18   0856  11/27/16   0900   WBC  6.2  7.5   HGB  14.4  15.5   HCT  41.1  44.1   PLT  175  196     Most recent hepatic panel:  Recent Labs   Lab Test  09/15/18   0856  11/27/16   0900   ALT  46  36   AST  31  23     Most recent creatinine:  Recent Labs   Lab Test  07/01/14   1406  03/07/12   1152   CR  1.24  0.89     Again, thank you for allowing me to participate in the care of your patient.      Sincerely,    oYssi Adkins MD

## 2018-10-10 ENCOUNTER — CARE COORDINATION (OUTPATIENT)
Dept: GASTROENTEROLOGY | Facility: CLINIC | Age: 28
End: 2018-10-10

## 2018-10-10 DIAGNOSIS — K51.90 ULCERATIVE COLITIS (H): Primary | ICD-10-CM

## 2018-10-10 RX ORDER — ACETAMINOPHEN 325 MG/1
650 TABLET ORAL ONCE
Status: CANCELLED
Start: 2018-10-10 | End: 2018-10-10

## 2018-10-10 RX ORDER — DIPHENHYDRAMINE HCL 25 MG
25 CAPSULE ORAL ONCE
Status: CANCELLED
Start: 2018-10-10 | End: 2018-10-10

## 2018-10-10 NOTE — PROGRESS NOTES
Pt establishing care with Dr. Adkins. New therapy plan entered as pt was pt of Dr. Stewart.  No changes in remicade therapy plan. Standing lab orders To be drawn day of remicade infusion prior to start of infusion every other infusion.  Remicade level to be drawn day of remicade infsuion prior to start of infusion. [facility] forms completed and scanned into the record. Blood needs to be sent to [place] to be sen out to [facility].

## 2018-10-12 ENCOUNTER — HOME INFUSION (PRE-WILLOW HOME INFUSION) (OUTPATIENT)
Dept: PHARMACY | Facility: CLINIC | Age: 28
End: 2018-10-12

## 2018-10-15 ENCOUNTER — TELEPHONE (OUTPATIENT)
Dept: GASTROENTEROLOGY | Facility: OUTPATIENT CENTER | Age: 28
End: 2018-10-15

## 2018-10-15 NOTE — PROGRESS NOTES
This is a recent snapshot of the patient's [facility] medical record.  For current drug dose and complete information and questions, call [phone] or In Basket pool, fv home infusion ([id number])  CSN Number:  [id number]

## 2018-10-17 ENCOUNTER — TELEPHONE (OUTPATIENT)
Dept: GASTROENTEROLOGY | Facility: OUTPATIENT CENTER | Age: 28
End: 2018-10-17

## 2018-10-17 NOTE — TELEPHONE ENCOUNTER
Patient taking any blood thinners ? No     Heart disease ? Denies     Lung disease ?Denies       Sleep apnea ?Denies     Diabetic ?Denies     Kidney disease ?Denies     Dialysis ? n/a    Electronic implanted medical devices ?Denies     Are you taking any narcotic pain medication ? No   What is your daily dosage ?    PTSD ? N/a     Prep instructions reviewed with patient ? Patient decliend review.  policy, MAC sedation plan reviewed. Advised patient to have someone stay with him post exam    Pharmacy : n/a    Indication for procedure : Ulcerative pancolitis without complication (H) [K51.00    Referring provider : Self     Arrival Time : 10 AM

## 2018-10-22 ENCOUNTER — TRANSFERRED RECORDS (OUTPATIENT)
Dept: HEALTH INFORMATION MANAGEMENT | Facility: CLINIC | Age: 28
End: 2018-10-22

## 2018-10-22 ENCOUNTER — DOCUMENTATION ONLY (OUTPATIENT)
Dept: GASTROENTEROLOGY | Facility: OUTPATIENT CENTER | Age: 28
End: 2018-10-22
Payer: COMMERCIAL

## 2018-10-22 ENCOUNTER — TELEPHONE (OUTPATIENT)
Dept: GASTROENTEROLOGY | Facility: CLINIC | Age: 28
End: 2018-10-22

## 2018-10-22 NOTE — TELEPHONE ENCOUNTER
Called and received message stating vmail box full and unable to leave message reminding patient of appointment for 10/29/18 at 7AM.

## 2018-10-24 LAB — COPATH REPORT: NORMAL

## 2018-10-29 ENCOUNTER — OFFICE VISIT (OUTPATIENT)
Dept: GASTROENTEROLOGY | Facility: CLINIC | Age: 28
End: 2018-10-29
Payer: COMMERCIAL

## 2018-10-29 ENCOUNTER — PATIENT OUTREACH (OUTPATIENT)
Dept: GASTROENTEROLOGY | Facility: CLINIC | Age: 28
End: 2018-10-29

## 2018-10-29 ENCOUNTER — CARE COORDINATION (OUTPATIENT)
Dept: GASTROENTEROLOGY | Facility: CLINIC | Age: 28
End: 2018-10-29

## 2018-10-29 VITALS
HEIGHT: 78 IN | DIASTOLIC BLOOD PRESSURE: 61 MMHG | SYSTOLIC BLOOD PRESSURE: 123 MMHG | TEMPERATURE: 97.6 F | WEIGHT: 246.4 LBS | OXYGEN SATURATION: 100 % | HEART RATE: 49 BPM | BODY MASS INDEX: 28.51 KG/M2

## 2018-10-29 DIAGNOSIS — K90.9 DIARRHEA DUE TO MALABSORPTION: ICD-10-CM

## 2018-10-29 DIAGNOSIS — K51.00 ULCERATIVE PANCOLITIS WITHOUT COMPLICATION (H): Primary | ICD-10-CM

## 2018-10-29 DIAGNOSIS — R19.7 DIARRHEA DUE TO MALABSORPTION: ICD-10-CM

## 2018-10-29 ASSESSMENT — PAIN SCALES - GENERAL: PAINLEVEL: NO PAIN (0)

## 2018-10-29 NOTE — LETTER
10/29/2018       RE: Noam Eugene  [address]  [place] [address]     Dear Colleague,    Thank you for referring your patient, Noam Eugene, to the [facility] GASTROENTEROLOGY AND IBD CLINIC at [facility]. Please see a copy of my visit note below.    IBD CLINIC VISIT     CC/REFERRING MD:  Monica Pop   REASON FOR CONSULTATION: Ulcerative colitis    IBD HISTORY  Age at diagnosis: 19 (3/2010)  Extent of disease: Pancolitis (e4)  Current UC medications:   - Infliximab 5mg/kg, started summer 2012  Prior UC surgeries:  Prior IBD Medications:  - Sulfasalazine - worsening of symptoms  - Asacol - No symptomatic improvement   - 6MP - Severe flare requiring prednisone.     DRUG MONITORING  TPMT enzyme activity: Intermediate   IBD7: pANCA positive (3/22/2010)    6-TGN/6-MMPN levels:    Biologic concentration: pending (infusion is Nov 10, 2018)    DISEASE ASSESSMENT  Labs:  Recent Labs   Lab Test  09/15/18   0856  11/27/16   0900   CRP  <2.9  6.6   SED  5  5     Endoscopic assessment: colonoscopy 10/22/18 ileal inflammation, continuous mild pancolitis.  Biopsies show scant superficial small intestinal mucosa with patchy acute inflammation/cryptitis in the ileum.  Colonic and rectal biopsies consistent with mild chronic active colitis.  Biopsies of the cecum and ascending colon show rare noncaseating granulomas identified, negative for dysplasia.  Enterography: --  Fecal calprotectin: pending  C diff: --  pMayo score: 0/9    ASSESSMENT/PLAN  This is a 28-year-old male with presumed ulcerative pancolitis, with recent endoscopic assessment and histologic activity concerning for Crohn's:    1.  Presumed ulcerative pancolitis, with recent endoscopic assessment and histologic activity concerning for Crohn's: Patient continues in clinical remission although endoscopically concerning for mild active disease.  New findings of inflammation within the ileum and biopsies  containing granulomas are concerning for development of Crohn's.   Patient had been interested in de-escalation of therapy, however this would not be advisable with evidence of current active inflammation.  We will assess infliximab level with next infusion and optimize dose pending level.  If level is optimized, we will consider adding 5-ASA to his regimen.  We will also obtain a fecal for trending if we need to calprotectin adjust his regimen, and we can continue to follow this marker as a target for mucosal healing.  -- Fecal calprotectin  -- Continue Infliximab at current dose (5mg/kg) every 8 weeks.   -- Infliximab trough concentration with next infusion  -- Yearly LFTs to screen for PSC (up to date)  -- CBC and LFTs and CRP every other Infliximab infusion (3-4 times a year).  -- Quant gold with next remicade infusion    -- Hep serologies will be obtained next infusion  -- consider curcumin as a supplement to 1.5-3 g a day.    2.  Possible hidradenitis suppurativa : Noted on outside hospital gastroenterology report.  Unclear if it was true HS, or old scarring.    -- Dermatology referral placed    IBD Health Care Maintenance:    Vaccinations:  All patients on biologics should avoid live vaccines.    -- Influenza (every year)  -- TdaP (every 10 years)  -- Pneumococcal Pneumonia (once plus booster at 5 years)  -- Yearly assessment for latent Tb (verbal screening and exam, PPD or QuantiFERON-Tb testing):   PPD negative 8/11/2010    One time confirmation of immunity or serologies:  -- Hepatitis A (serologies or immunizations): will check serologies  -- Hepatitis B (serologies or immunizations):will check serologies  -- Varicella: had chicken pox as a child  -- MMR  -- HPV (all aged 18-26)  -- Meningococcal meningitis (all patients at risk for meningitis)-- Due to the immunosuppression in this patient, I would not advise administration of live vaccines such as varicella/VZV, intranasal influenza, MMR, or yellow fever  vaccine (if travelling).      Bone mineral density screening   -- Recommend all patients supplement with calcium and vitamin D    Cancer Screening:  Colon cancer screening:  Given pancolitis, recommend patient undergo regular dysplasia surveillance.    Skin cancer screening: Annual visual exam of skin by dermatologist since patient is immunocompromised    Depression Screening:  -- Over the last month, have you felt down, depressed, or hopeless? No  -- Over the last month, have you felt little interest or pleasure doing things? No    Misc:  -- Avoid tobacco use  -- Avoid NSAIDs as there is potentially a 25% chance of causing an IBD flare    Return to clinic in 4-6 months    Thank you for this consultation.  It was a pleasure to participate in the care of this patient; please contact us with any further questions.      This note was created with voice recognition software, and while reviewed for accuracy, typos may remain.     Fili Rutherford PA-C  Division of Gastroenterology, Hepatology and Nutrition  [facility]       HPI:   28-year-old male who presents today for follow-up.  Patient previously followed with pediatric IBD program then moved to [place] for a few years and return to the [place] this year and established care with Dr. Adkins.  Briefly patient was diagnosed in 2010 with ulcerative pancolitis.  He initially started on sulfasalazine and mesalamine with poor disease control.  He had frequent flares requiring prednisone.  He did well on 6-MP 25 mg a day (he was a heterozygote for TPMT enzyme activity), however he eventually flared and required prednisone.  He was then transitioned to infliximab in 2012. A few years ago, was started to get some symptoms at the end of his 8 week infusion, so went to every 6 weeks.  At some point, his dose was increased and he was placed back on every 8 week infusions.     Clinically he has remained stable without evidence of any recent flares.  He is having  2 bowel movements per day that are formed.  If he has any alteration in consistency, he feels this is due to dairy.  He denies any blood in the stool.  Occasionally he has urgency with stools.  No nighttime stools, tenesmus, abdominal pain, nausea, vomiting or fevers.  Weight is stable.    He recently underwent a colonoscopy under the impression of considering de-escalation of therapy.  There was evidence of ileitis (question of backwash ileitis) and mild continuous pancolitis. Biopsies show scant superficial small intestinal mucosa with patchy acute inflammation/cryptitis in the ileum.  Colonic and rectal biopsies consistent with mild chronic active colitis.  Biopsies of the cecum and ascending colon show rare noncaseating granulomas identified, negative for dysplasia.      ROS:    No fevers or chills  No weight loss  No blurry vision, double vision or change in vision  No sore throat  No lymphadenopathy  No headache, paraesthesias, or weakness in a limb  No shortness of breath or wheezing  No chest pain or pressure  No arthralgias or myalgias  No rashes or skin changes  No odynophagia or dysphagia  No BRBPR, hematochezia, melena  No dysuria, frequency or urgency  No hot/cold intolerance or polyria  No anxiety or depression    Extra intestinal manifestations of IBD:  No uveitis/episcleritis  No aphthous ulcers   No arthritis   No erythema nodosum/pyoderma gangrenosum.     PERTINENT PAST MEDICAL HISTORY:  Past Medical History:   Diagnosis Date     Ulcerative colitis (H)        PREVIOUS SURGERIES:  Past Surgical History:   Procedure Laterality Date     COLONOSCOPY  7/10/2013    Procedure: COMBINED COLONOSCOPY, SINGLE BIOPSY/POLYPECTOMY BY BIOPSY;;  Surgeon: Julien Stewart MD;  Location:  OR     ESOPHAGOSCOPY, GASTROSCOPY, DUODENOSCOPY (EGD), COMBINED  7/10/2013    Procedure: COMBINED ESOPHAGOSCOPY, GASTROSCOPY, DUODENOSCOPY (EGD), BIOPSY SINGLE OR MULTIPLE;  Colonoscopy, EGD, ulcerative colitis;  Surgeon: Pat  "MD Julien;  Location: [facility] OR       PREVIOUS ENDOSCOPY:  2013: Normal colon; path with mild to minimal inflammation.     ALLERGIES:   No Known Allergies    PERTINENT MEDICATIONS:    Current Outpatient Prescriptions:      inFLIXimab (REMICADE) 100 MG injection, Inject 1,000 mg into the vein Every 8 weeks, Disp: , Rfl:     SOCIAL HISTORY:  [employer] ([employer])  , no children  Never smoker    FAMILY HISTORY:  Family History   Problem Relation Age of Onset     Hypertension Father      Colon Cancer No family hx of      No CRC, no IBD  Past/family/social history reviewed and no changes    PHYSICAL EXAMINATION:  Constitutional: aaox3, cooperative, pleasant, not dyspneic/diaphoretic, no acute distress  Vitals reviewed: /61  Pulse (!) 49  Temp 97.6  F (36.4  C) (Oral)  Ht 2.007 m (6' 7\")  Wt 111.8 kg (246 lb 6.4 oz)  SpO2 100%  BMI 27.76 kg/m2  Wt:   Wt Readings from Last 2 Encounters:   09/24/18 111.6 kg (246 lb 1.6 oz)   09/15/18 110.1 kg (242 lb 12.8 oz)      Eyes: Sclera anicteric/injected  Ears/nose/mouth/throat: Normal oropharynx without ulcers or exudate, mucus membranes moist, hearing intact  Neck: supple, thyroid normal size  CV: No edema  Respiratory: Unlabored breathing  Lymph: No axillary, submandibular, supraclavicular or inguinal lymphadenopathy  Abd: Nondistended, +bs, no hepatosplenomegaly, nontender, no peritoneal signs  Skin: warm, perfused, no jaundice  Psych: Normal affect  MSK: Normal gait      PERTINENT STUDIES:  Most recent CBC:  Recent Labs   Lab Test  09/15/18   0856  11/27/16   0900   WBC  6.2  7.5   HGB  14.4  15.5   HCT  41.1  44.1   PLT  175  196     Most recent hepatic panel:  Recent Labs   Lab Test  09/15/18   0856  11/27/16   0900   ALT  46  36   AST  31  23     Most recent creatinine:  Recent Labs   Lab Test  07/01/14   1406  03/07/12   1152   CR  1.24  0.89     Again, thank you for allowing me to participate in the care of your patient.  "     Sincerely,  Fili Rutherford PA-C

## 2018-10-29 NOTE — PROGRESS NOTES
IBD CLINIC VISIT     CC/REFERRING MD:  Monica Pop   REASON FOR CONSULTATION: Ulcerative colitis    IBD HISTORY  Age at diagnosis: 19 (3/2010)  Extent of disease: Pancolitis (e4)  Current UC medications:   - Infliximab 5mg/kg, started summer 2012  Prior UC surgeries:  Prior IBD Medications:  - Sulfasalazine - worsening of symptoms  - Asacol - No symptomatic improvement   - 6MP - Severe flare requiring prednisone.     DRUG MONITORING  TPMT enzyme activity: Intermediate   IBD7: pANCA positive (3/22/2010)    6-TGN/6-MMPN levels:    Biologic concentration: pending (infusion is Nov 10, 2018)    DISEASE ASSESSMENT  Labs:  Recent Labs   Lab Test  09/15/18   0856  11/27/16   0900   CRP  <2.9  6.6   SED  5  5     Endoscopic assessment: colonoscopy 10/22/18 ileal inflammation, continuous mild pancolitis.  Biopsies show scant superficial small intestinal mucosa with patchy acute inflammation/cryptitis in the ileum.  Colonic and rectal biopsies consistent with mild chronic active colitis.  Biopsies of the cecum and ascending colon show rare noncaseating granulomas identified, negative for dysplasia.  Enterography: --  Fecal calprotectin: pending  C diff: --  pMayo score: 0/9    ASSESSMENT/PLAN  This is a 28-year-old male with presumed ulcerative pancolitis, with recent endoscopic assessment and histologic activity concerning for Crohn's:    1.  Presumed ulcerative pancolitis, with recent endoscopic assessment and histologic activity concerning for Crohn's: Patient continues in clinical remission although endoscopically concerning for mild active disease.  New findings of inflammation within the ileum and biopsies containing granulomas are concerning for development of Crohn's.   Patient had been interested in de-escalation of therapy, however this would not be advisable with evidence of current active inflammation.  We will assess infliximab level with next infusion and optimize dose pending level.  If level is  optimized, we will consider adding 5-ASA to his regimen.  We will also obtain a fecal for trending if we need to calprotectin adjust his regimen, and we can continue to follow this marker as a target for mucosal healing.  -- Fecal calprotectin  -- Continue Infliximab at current dose (5mg/kg) every 8 weeks.   -- Infliximab trough concentration with next infusion  -- Yearly LFTs to screen for PSC (up to date)  -- CBC and LFTs and CRP every other Infliximab infusion (3-4 times a year).  -- Quant gold with next remicade infusion    -- Hep serologies will be obtained next infusion  -- consider curcumin as a supplement to 1.5-3 g a day.    2.  Possible hidradenitis suppurativa : Noted on outside hospital gastroenterology report.  Unclear if it was true HS, or old scarring.    -- Dermatology referral placed    IBD Health Care Maintenance:    Vaccinations:  All patients on biologics should avoid live vaccines.    -- Influenza (every year)  -- TdaP (every 10 years)  -- Pneumococcal Pneumonia (once plus booster at 5 years)  -- Yearly assessment for latent Tb (verbal screening and exam, PPD or QuantiFERON-Tb testing):   PPD negative 8/11/2010    One time confirmation of immunity or serologies:  -- Hepatitis A (serologies or immunizations): will check serologies  -- Hepatitis B (serologies or immunizations):will check serologies  -- Varicella: had chicken pox as a child  -- MMR  -- HPV (all aged 18-26)  -- Meningococcal meningitis (all patients at risk for meningitis)-- Due to the immunosuppression in this patient, I would not advise administration of live vaccines such as varicella/VZV, intranasal influenza, MMR, or yellow fever vaccine (if travelling).      Bone mineral density screening   -- Recommend all patients supplement with calcium and vitamin D    Cancer Screening:  Colon cancer screening:  Given pancolitis, recommend patient undergo regular dysplasia surveillance.    Skin cancer screening: Annual visual exam of skin  by dermatologist since patient is immunocompromised    Depression Screening:  -- Over the last month, have you felt down, depressed, or hopeless? No  -- Over the last month, have you felt little interest or pleasure doing things? No    Misc:  -- Avoid tobacco use  -- Avoid NSAIDs as there is potentially a 25% chance of causing an IBD flare    Return to clinic in 4-6 months    Thank you for this consultation.  It was a pleasure to participate in the care of this patient; please contact us with any further questions.      This note was created with voice recognition software, and while reviewed for accuracy, typos may remain.     Fili Rutherford PA-C  Division of Gastroenterology, Hepatology and Nutrition  [facility]       HPI:   28-year-old male who presents today for follow-up.  Patient previously followed with pediatric IBD program then moved to [place] for a few years and return to the [place] this year and established care with Dr. Adkins.  Briefly patient was diagnosed in 2010 with ulcerative pancolitis.  He initially started on sulfasalazine and mesalamine with poor disease control.  He had frequent flares requiring prednisone.  He did well on 6-MP 25 mg a day (he was a heterozygote for TPMT enzyme activity), however he eventually flared and required prednisone.  He was then transitioned to infliximab in 2012. A few years ago, was started to get some symptoms at the end of his 8 week infusion, so went to every 6 weeks.  At some point, his dose was increased and he was placed back on every 8 week infusions.     Clinically he has remained stable without evidence of any recent flares.  He is having 2 bowel movements per day that are formed.  If he has any alteration in consistency, he feels this is due to dairy.  He denies any blood in the stool.  Occasionally he has urgency with stools.  No nighttime stools, tenesmus, abdominal pain, nausea, vomiting or fevers.  Weight is stable.    He recently  underwent a colonoscopy under the impression of considering de-escalation of therapy.  There was evidence of ileitis (question of backwash ileitis) and mild continuous pancolitis. Biopsies show scant superficial small intestinal mucosa with patchy acute inflammation/cryptitis in the ileum.  Colonic and rectal biopsies consistent with mild chronic active colitis.  Biopsies of the cecum and ascending colon show rare noncaseating granulomas identified, negative for dysplasia.      ROS:    No fevers or chills  No weight loss  No blurry vision, double vision or change in vision  No sore throat  No lymphadenopathy  No headache, paraesthesias, or weakness in a limb  No shortness of breath or wheezing  No chest pain or pressure  No arthralgias or myalgias  No rashes or skin changes  No odynophagia or dysphagia  No BRBPR, hematochezia, melena  No dysuria, frequency or urgency  No hot/cold intolerance or polyria  No anxiety or depression    Extra intestinal manifestations of IBD:  No uveitis/episcleritis  No aphthous ulcers   No arthritis   No erythema nodosum/pyoderma gangrenosum.     PERTINENT PAST MEDICAL HISTORY:  Past Medical History:   Diagnosis Date     Ulcerative colitis (H)        PREVIOUS SURGERIES:  Past Surgical History:   Procedure Laterality Date     COLONOSCOPY  7/10/2013    Procedure: COMBINED COLONOSCOPY, SINGLE BIOPSY/POLYPECTOMY BY BIOPSY;;  Surgeon: Julien Stewart MD;  Location:  OR     ESOPHAGOSCOPY, GASTROSCOPY, DUODENOSCOPY (EGD), COMBINED  7/10/2013    Procedure: COMBINED ESOPHAGOSCOPY, GASTROSCOPY, DUODENOSCOPY (EGD), BIOPSY SINGLE OR MULTIPLE;  Colonoscopy, EGD, ulcerative colitis;  Surgeon: Julien Stewart MD;  Location:  OR       PREVIOUS ENDOSCOPY:  2013: Normal colon; path with mild to minimal inflammation.     ALLERGIES:   No Known Allergies    PERTINENT MEDICATIONS:    Current Outpatient Prescriptions:      inFLIXimab (REMICADE) 100 MG injection, Inject 1,000 mg into the vein Every 8 weeks,  "Disp: , Rfl:     SOCIAL HISTORY:  [id number] ([employer])  , no children  Never smoker    FAMILY HISTORY:  Family History   Problem Relation Age of Onset     Hypertension Father      Colon Cancer No family hx of      No CRC, no IBD  Past/family/social history reviewed and no changes    PHYSICAL EXAMINATION:  Constitutional: aaox3, cooperative, pleasant, not dyspneic/diaphoretic, no acute distress  Vitals reviewed: /61  Pulse (!) 49  Temp 97.6  F (36.4  C) (Oral)  Ht 2.007 m (6' 7\")  Wt 111.8 kg (246 lb 6.4 oz)  SpO2 100%  BMI 27.76 kg/m2  Wt:   Wt Readings from Last 2 Encounters:   09/24/18 111.6 kg (246 lb 1.6 oz)   09/15/18 110.1 kg (242 lb 12.8 oz)      Eyes: Sclera anicteric/injected  Ears/nose/mouth/throat: Normal oropharynx without ulcers or exudate, mucus membranes moist, hearing intact  Neck: supple, thyroid normal size  CV: No edema  Respiratory: Unlabored breathing  Lymph: No axillary, submandibular, supraclavicular or inguinal lymphadenopathy  Abd: Nondistended, +bs, no hepatosplenomegaly, nontender, no peritoneal signs  Skin: warm, perfused, no jaundice  Psych: Normal affect  MSK: Normal gait      PERTINENT STUDIES:  Most recent CBC:  Recent Labs   Lab Test  09/15/18   0856  11/27/16   0900   WBC  6.2  7.5   HGB  14.4  15.5   HCT  41.1  44.1   PLT  175  196     Most recent hepatic panel:  Recent Labs   Lab Test  09/15/18   0856  11/27/16   0900   ALT  46  36   AST  31  23     Most recent creatinine:  Recent Labs   Lab Test  07/01/14   1406  03/07/12   1152   CR  1.24  0.89         Answers for HPI/ROS submitted by the patient on 10/29/2018   General Symptoms: No  Skin Symptoms: No  HENT Symptoms: No  EYE SYMPTOMS: No  HEART SYMPTOMS: No  LUNG SYMPTOMS: No  INTESTINAL SYMPTOMS: No  URINARY SYMPTOMS: No  REPRODUCTIVE SYMPTOMS: No  SKELETAL SYMPTOMS: No  BLOOD SYMPTOMS: No  NERVOUS SYSTEM SYMPTOMS: No  MENTAL HEALTH SYMPTOMS: No    "

## 2018-10-29 NOTE — NURSING NOTE
"Chief Complaint   Patient presents with     RECHECK     return IBD       Vitals:    10/29/18 0700   BP: 123/61   Pulse: (!) 49   Temp: 97.6  F (36.4  C)   TempSrc: Oral   SpO2: 100%   Weight: 111.8 kg (246 lb 6.4 oz)   Height: 2.007 m (6' 7\")       Body mass index is 27.76 kg/(m^2).  AMMON Andrade                          "

## 2018-10-29 NOTE — PATIENT INSTRUCTIONS
It was a pleasure taking care of you today.  I've included a brief summary of our discussion and care plan from today's visit below.  Please review this information with your primary care provider.  ______________________________________________________________________    My recommendations are summarized as follows:    -- Continue Remicade every 8 weeks   -- Labs with your infusions  -- Next endoscopic assessment: 2020/2021  -- Patient with IBD we recommend supplementation vitamin D 1000 units daily and calcium 500 mg twice daily.  -- Vaccines/immunizations to be updated: tetanus, pneumonia vaccine  -- Yearly Dermatology visit for skin check while on immunosuppressive therapy. Can call [phone] to schedule.  -- No NSAIDs (ibuprofen, or anything containing ibuprofen)       For additional resources about inflammatory bowel disease visit [email or web address]      Return to GI Clinic in 3 months to review your progress.    ______________________________________________________________________    Who do I call with any questions after my visit?  Please be in touch if there are any further questions that arise following today's visit.  There are multiple ways to contact your gastroenterology care team.        During business hours, you may reach a Gastroenterology nurse at [phone], option 3.       To schedule or reschedule an appointment, please call [phone].       You can always send a secure message through [employer].  [employer] messages are answered by your nurse or doctor typically within 24 hours.  Please allow extra time on weekends and holidays.        For urgent/emergent questions after business hours, you may reach the on-call GI Fellow by contacting the [facility] at [phone].      In order for your refill to be processed in a timely fashion, it is your responsibility to ensure you follow the recommendations from your provider regarding your laboratory  studies and follow up appointments.       How will I get the results of any tests ordered?    You will receive all of your results.  If you have signed up for [employer], any tests ordered at your visit will be available to you after your physician reviews them.  Typically this takes 1-2 weeks.  If there are urgent results that require a change in your care plan, your physician or nurse will call you to discuss the next steps.      What is [employer]?  [employer] is a secure way for you to access all of your healthcare records from the [facility].  It is a web based computer program, so you can sign on to it from any location.  It also allows you to send secure messages to your care team.  I recommend signing up for [employer] access if you have not already done so and are comfortable with using a computer.      How to I schedule a follow-up visit?  If you did not schedule a follow-up visit today, please call [phone] to schedule a follow-up office visit.        Sincerely,    Fili Rutherford PA-C  [facility]  Division of Gastroenterology

## 2018-10-29 NOTE — PROGRESS NOTES
Edited therapy plan to add  Labs top be drawn the day of the remicade infusion prior to start of infusion Please draw Hepatitis B Surface antibody, Hepatitis B surface antigen and Hepatitis B core antibody and Quantiferon Gold TB.  In basket to infusion specialist  to check on if pt needs to transition to home infusion .   Due November 10.

## 2018-10-29 NOTE — PROGRESS NOTES
Called and discussed the pt's transition to home infusion offsite center. Pharmacist aware of th additional labs needed.  My chart message to pt.        Yes, he needs to transition. I checked with [facility] and he is scheduled for a dose with them on 11/10.     Let me know if there are any further questions.     Thank you,   Terri Ray  Infusion    [email or web address]  [email or web address]   Office: [phone]  Fax: [phone]

## 2018-10-29 NOTE — MR AVS SNAPSHOT
After Visit Summary   10/29/2018    Noam Eugene    MRN: [id number]           Patient Information     Date Of Birth          1990        Visit Information        Provider Department      10/29/2018 7:00 AM Fili Rutherford PA-C [facility]        Today's Diagnoses     Ulcerative pancolitis without complication (H)    -  1    Diarrhea due to malabsorption          Care Instructions    It was a pleasure taking care of you today.  I've included a brief summary of our discussion and care plan from today's visit below.  Please review this information with your primary care provider.  ______________________________________________________________________    My recommendations are summarized as follows:    -- Continue Remicade every 8 weeks   -- Labs with your infusions  -- Next endoscopic assessment: 2020/2021  -- Patient with IBD we recommend supplementation vitamin D 1000 units daily and calcium 500 mg twice daily.  -- Vaccines/immunizations to be updated: tetanus, pneumonia vaccine  -- Yearly Dermatology visit for skin check while on immunosuppressive therapy. Can call [phone] to schedule.  -- No NSAIDs (ibuprofen, or anything containing ibuprofen)       For additional resources about inflammatory bowel disease visit [email or web address]      Return to GI Clinic in 3 months to review your progress.    ______________________________________________________________________    Who do I call with any questions after my visit?  Please be in touch if there are any further questions that arise following today's visit.  There are multiple ways to contact your gastroenterology care team.        During business hours, you may reach a Gastroenterology nurse at [phone], option 3.       To schedule or reschedule an appointment, please call [phone].       You can always send a secure message through [employer].  [employer] messages are answered by  your nurse or doctor typically within 24 hours.  Please allow extra time on weekends and holidays.        For urgent/emergent questions after business hours, you may reach the on-call GI Fellow by contacting the [facility]  at [phone].      In order for your refill to be processed in a timely fashion, it is your responsibility to ensure you follow the recommendations from your provider regarding your laboratory studies and follow up appointments.       How will I get the results of any tests ordered?    You will receive all of your results.  If you have signed up for [employer], any tests ordered at your visit will be available to you after your physician reviews them.  Typically this takes 1-2 weeks.  If there are urgent results that require a change in your care plan, your physician or nurse will call you to discuss the next steps.      What is [employer]?  [employer] is a secure way for you to access all of your healthcare records from the [facility].  It is a web based computer program, so you can sign on to it from any location.  It also allows you to send secure messages to your care team.  I recommend signing up for [employer] access if you have not already done so and are comfortable with using a computer.      How to I schedule a follow-up visit?  If you did not schedule a follow-up visit today, please call [phone] to schedule a follow-up office visit.        Sincerely,    Fili Rutherford PA-C  [facility]  Division of Gastroenterology                Follow-ups after your visit        Additional Services     DERMATOLOGY REFERRAL       Your provider has referred you to: [facility]: Dermatology Clinic [facility] [phone]   [email or web address]     Please be aware that coverage of these services is subject to the terms and limitations of your health insurance plan.  Call member services at your health plan with any benefit or coverage  questions.      Please bring the following with you to your appointment:    (1) Any X-Rays, CTs or MRIs which have been performed.  Contact the facility where they were done to arrange for  prior to your scheduled appointment.    (2) List of current medications  (3) This referral request   (4) Any documents/labs given to you for this referral                  Follow-up notes from your care team     Return in about 3 months (around 1/29/2019).      Future tests that were ordered for you today     Open Future Orders        Priority Expected Expires Ordered    Calprotectin Feces Routine  10/29/2019 10/29/2018    Hepatitis B Surface Antibody [TSO9085] Routine 10/29/2018 12/28/2018 10/29/2018    Hepatitis B surface antigen [AMU168] Routine 10/29/2018 12/28/2018 10/29/2018    Hepatitis B core antibody [HSD3016] Routine 10/29/2018 12/28/2018 10/29/2018    Tuberculosis by Quantiferon (gold) [DRT7114] Routine 10/29/2018 12/28/2018 10/29/2018            Who to contact     Please call your clinic at [phone] to:    Ask questions about your health    Make or cancel appointments    Discuss your medicines    Learn about your test results    Speak to your doctor            Additional Information About Your Visit        [employer] Information     [employer] gives you secure access to your electronic health record. If you see a primary care provider, you can also send messages to your care team and make appointments. If you have questions, please call your primary care clinic.  If you do not have a primary care provider, please call [phone] and they will assist you.      Z[employer]ipe is an electronic gateway that provides easy, online access to your medical records. With [employer], you can request a clinic appointment, read your test results, renew a prescription or communicate with your care team.     To access your existing account, please contact your [facility] or call [phone] for  "assistance.        Care EveryWhere ID     This is your Care EveryWhere ID. This could be used by other organizations to access your [facility] medical records  [id number]        Your Vitals Were     Pulse Temperature Height Pulse Oximetry BMI (Body Mass Index)       49 97.6  F (36.4  C) (Oral) 2.007 m (6' 7\") 100% 27.76 kg/m2        Blood Pressure from Last 3 Encounters:   10/29/18 123/61   09/24/18 134/72   09/15/18 118/68    Weight from Last 3 Encounters:   10/29/18 111.8 kg (246 lb 6.4 oz)   09/24/18 111.6 kg (246 lb 1.6 oz)   09/15/18 110.1 kg (242 lb 12.8 oz)              We Performed the Following     DERMATOLOGY REFERRAL        Primary Care Provider Office Phone # Fax #    Monica Pop [phone]       [facility] AT [place] [address] [address]        Equal Access to Services     [facility]: Hadii aad ku hadasho Soomaali, waaxda luqadaha, qaybta kaalmada adeegyada, waxay idiin hayaan tanya quick . So [facility] [phone].    ATENCIÓN: Si habla español, tiene a chi disposición servicios gratuitos de asistencia lingüística. [facility] [phone].    We comply with applicable federal civil rights laws and [place] laws. We do not discriminate on the basis of race, color, national origin, age, disability, sex, sexual orientation, or gender identity.            Thank you!     Thank you for choosing [facility]  for your care. Our goal is always to provide you with excellent care. Hearing back from our patients is one way we can continue to improve our services. Please take a few minutes to complete the written survey that you may receive in the mail after your visit with us. Thank you!             Your Updated Medication List - Protect others around you: Learn how to safely use, store and throw away your medicines at www.disposemymeds.org.          This list is accurate as of 10/29/18  7:43 AM.  Always use your most recent " med list.                   Brand Name Dispense Instructions for use Diagnosis    REMICADE 100 MG injection   Generic drug:  inFLIXimab      Inject 1,000 mg into the vein Every 8 weeks

## 2018-11-08 ENCOUNTER — PATIENT OUTREACH (OUTPATIENT)
Dept: GASTROENTEROLOGY | Facility: CLINIC | Age: 28
End: 2018-11-08

## 2018-11-09 ENCOUNTER — HOME INFUSION (PRE-WILLOW HOME INFUSION) (OUTPATIENT)
Dept: PHARMACY | Facility: CLINIC | Age: 28
End: 2018-11-09

## 2018-11-10 ENCOUNTER — TELEPHONE (OUTPATIENT)
Dept: GASTROENTEROLOGY | Facility: CLINIC | Age: 28
End: 2018-11-10

## 2018-11-10 ENCOUNTER — HOME INFUSION (PRE-WILLOW HOME INFUSION) (OUTPATIENT)
Dept: PHARMACY | Facility: CLINIC | Age: 28
End: 2018-11-10

## 2018-11-10 ENCOUNTER — HOSPITAL ENCOUNTER (OUTPATIENT)
Facility: CLINIC | Age: 28
Setting detail: SPECIMEN
Discharge: HOME | End: 2018-11-10
Admitting: INTERNAL MEDICINE
Payer: COMMERCIAL

## 2018-11-10 LAB
ALBUMIN SERPL-MCNC: 3.7 G/DL (ref 3.4–5)
ALP SERPL-CCNC: 64 U/L (ref 40–150)
ALT SERPL W P-5'-P-CCNC: 36 U/L (ref 0–70)
AST SERPL W P-5'-P-CCNC: 22 U/L (ref 0–45)
BASOPHILS # BLD AUTO: 0 10E9/L (ref 0–0.2)
BASOPHILS NFR BLD AUTO: 0 %
BILIRUB DIRECT SERPL-MCNC: 0.2 MG/DL (ref 0–0.2)
BILIRUB SERPL-MCNC: 0.5 MG/DL (ref 0.2–1.3)
CRP SERPL-MCNC: <2.9 MG/L (ref 0–8)
DIFFERENTIAL METHOD BLD: NORMAL
EOSINOPHIL # BLD AUTO: 0 10E9/L (ref 0–0.7)
EOSINOPHIL NFR BLD AUTO: 0.6 %
ERYTHROCYTE [DISTWIDTH] IN BLOOD BY AUTOMATED COUNT: 12.6 % (ref 10–15)
ERYTHROCYTE [SEDIMENTATION RATE] IN BLOOD BY WESTERGREN METHOD: 4 MM/H (ref 0–15)
HCT VFR BLD AUTO: 44.8 % (ref 40–53)
HGB BLD-MCNC: 15.1 G/DL (ref 13.3–17.7)
IMM GRANULOCYTES # BLD: 0 10E9/L (ref 0–0.4)
IMM GRANULOCYTES NFR BLD: 0.2 %
LYMPHOCYTES # BLD AUTO: 1.5 10E9/L (ref 0.8–5.3)
LYMPHOCYTES NFR BLD AUTO: 28.9 %
MCH RBC QN AUTO: 31.6 PG (ref 26.5–33)
MCHC RBC AUTO-ENTMCNC: 33.7 G/DL (ref 31.5–36.5)
MCV RBC AUTO: 94 FL (ref 78–100)
MONOCYTES # BLD AUTO: 0.3 10E9/L (ref 0–1.3)
MONOCYTES NFR BLD AUTO: 5 %
NEUTROPHILS # BLD AUTO: 3.4 10E9/L (ref 1.6–8.3)
NEUTROPHILS NFR BLD AUTO: 65.3 %
NRBC # BLD AUTO: 0 10*3/UL
NRBC BLD AUTO-RTO: 0 /100
PLATELET # BLD AUTO: 207 10E9/L (ref 150–450)
PROT SERPL-MCNC: 7.8 G/DL (ref 6.8–8.8)
RBC # BLD AUTO: 4.78 10E12/L (ref 4.4–5.9)
WBC # BLD AUTO: 5.2 10E9/L (ref 4–11)

## 2018-11-10 PROCEDURE — 80076 HEPATIC FUNCTION PANEL: CPT | Performed by: INTERNAL MEDICINE

## 2018-11-10 PROCEDURE — 87340 HEPATITIS B SURFACE AG IA: CPT | Performed by: INTERNAL MEDICINE

## 2018-11-10 PROCEDURE — 86140 C-REACTIVE PROTEIN: CPT | Performed by: INTERNAL MEDICINE

## 2018-11-10 PROCEDURE — 85025 COMPLETE CBC W/AUTO DIFF WBC: CPT | Performed by: INTERNAL MEDICINE

## 2018-11-10 PROCEDURE — 86480 TB TEST CELL IMMUN MEASURE: CPT | Performed by: INTERNAL MEDICINE

## 2018-11-10 PROCEDURE — 86706 HEP B SURFACE ANTIBODY: CPT | Performed by: INTERNAL MEDICINE

## 2018-11-10 PROCEDURE — 86704 HEP B CORE ANTIBODY TOTAL: CPT | Performed by: INTERNAL MEDICINE

## 2018-11-10 PROCEDURE — 85652 RBC SED RATE AUTOMATED: CPT | Performed by: INTERNAL MEDICINE

## 2018-11-10 NOTE — TELEPHONE ENCOUNTER
GI phone call     Received call from nurse that IV infiltrated during IFX infusion. Small bump present. Planning to use warm pack, verbal order given. New IV placed and continuing infusion. Advised to continue to monitor symptoms, call if any worsening swelling, pain, redness, or fever.     Mack Tejeda MD  GI Fellow  p [phone]

## 2018-11-12 LAB
GAMMA INTERFERON BACKGROUND BLD IA-ACNC: 0.02 IU/ML
HBV CORE AB SERPL QL IA: NONREACTIVE
HBV SURFACE AB SERPL IA-ACNC: 221.18 M[IU]/ML
HBV SURFACE AG SERPL QL IA: NONREACTIVE
M TB IFN-G BLD-IMP: NEGATIVE
M TB IFN-G CD4+ BCKGRND COR BLD-ACNC: 7.92 IU/ML
MITOGEN IGNF BCKGRD COR BLD-ACNC: 0 IU/ML
MITOGEN IGNF BCKGRD COR BLD-ACNC: 0.01 IU/ML

## 2018-11-12 NOTE — PROGRESS NOTES
This is a recent snapshot of the patient's [facility] medical record.  For current drug dose and complete information and questions, call [phone] or In Basket pool, fv home infusion ([id number])  CSN Number:  [id number]

## 2018-11-13 NOTE — PROGRESS NOTES
This is a recent snapshot of the patient's [facility] medical record.  For current drug dose and complete information and questions, call [phone] or In Basket pool, fv home infusion ([id number])  CSN Number:  [id number]

## 2018-11-16 LAB — LAB SCANNED RESULT: NORMAL

## 2018-11-27 ENCOUNTER — TELEPHONE (OUTPATIENT)
Dept: GASTROENTEROLOGY | Facility: CLINIC | Age: 28
End: 2018-11-27

## 2018-11-29 ENCOUNTER — TELEPHONE (OUTPATIENT)
Dept: GASTROENTEROLOGY | Facility: CLINIC | Age: 28
End: 2018-11-29

## 2018-11-29 ENCOUNTER — PATIENT OUTREACH (OUTPATIENT)
Dept: GASTROENTEROLOGY | Facility: CLINIC | Age: 28
End: 2018-11-29

## 2018-11-29 ENCOUNTER — HOME INFUSION (PRE-WILLOW HOME INFUSION) (OUTPATIENT)
Dept: PHARMACY | Facility: CLINIC | Age: 28
End: 2018-11-29

## 2018-11-29 DIAGNOSIS — K51.90 ULCERATIVE COLITIS (H): Primary | ICD-10-CM

## 2018-11-29 RX ORDER — PREDNISONE 5 MG/1
TABLET ORAL
Qty: 108 TABLET | Refills: 0 | Status: SHIPPED | OUTPATIENT
Start: 2018-11-29 | End: 2019-03-04

## 2018-11-29 NOTE — PROGRESS NOTES
Pt developed antibodies.   Discontinued remicade. Notified [facility] pharmacist to discontinue plan . Pt said he started developing a rash on his forearms about 3 to 4 days after his remicade. Which was about 2 weeks ago.  States itchy and scaly in nature.  Now on his stomach and lower back. No other symptoms.  Have discontinued his remicade as pt developed antibodies.  Suggested he take some benadryl. Pt denies that he has had any changes in soap, laundry or any new lotions. Pt has an appt on Monday with Ms. Rutherford. Will route to Dr. Adkins.

## 2018-11-29 NOTE — TELEPHONE ENCOUNTER
Patient called in confirming appointment for GI follow up 12/3/18. Also inquired about referral to dermatology that was placed at last GI visit 10/29/18. Provided dermatology number and patient to call to set up appointment.

## 2018-11-29 NOTE — PROGRESS NOTES
Discussed symptoms with Dr. Adkins. Prednisone taper. Pt aware and sent message via my chart also. Also pt aware if symptoms increase to call and has the after office hours number

## 2018-11-30 ENCOUNTER — PATIENT OUTREACH (OUTPATIENT)
Dept: GASTROENTEROLOGY | Facility: CLINIC | Age: 28
End: 2018-11-30

## 2018-11-30 NOTE — PROGRESS NOTES
Called pt for update on rash.  Pt just started prednisone today.  States thinks rash is little better but just started prednsione. Has clinic visit on Monday.

## 2018-12-03 ENCOUNTER — OFFICE VISIT (OUTPATIENT)
Dept: GASTROENTEROLOGY | Facility: CLINIC | Age: 28
End: 2018-12-03
Payer: COMMERCIAL

## 2018-12-03 VITALS
DIASTOLIC BLOOD PRESSURE: 71 MMHG | HEART RATE: 63 BPM | WEIGHT: 250.1 LBS | HEIGHT: 78 IN | TEMPERATURE: 97.5 F | BODY MASS INDEX: 28.94 KG/M2 | OXYGEN SATURATION: 96 % | SYSTOLIC BLOOD PRESSURE: 140 MMHG

## 2018-12-03 DIAGNOSIS — K51.00 ULCERATIVE PANCOLITIS WITHOUT COMPLICATION (H): Primary | ICD-10-CM

## 2018-12-03 RX ORDER — MESALAMINE 1.2 G/1
4800 TABLET, DELAYED RELEASE ORAL EVERY MORNING
Qty: 180 TABLET | Refills: 3 | Status: SHIPPED | OUTPATIENT
Start: 2018-12-03 | End: 2019-08-19

## 2018-12-03 ASSESSMENT — ENCOUNTER SYMPTOMS
SKIN CHANGES: 0
POOR WOUND HEALING: 0
NAIL CHANGES: 0

## 2018-12-03 ASSESSMENT — PAIN SCALES - GENERAL: PAINLEVEL: NO PAIN (0)

## 2018-12-03 NOTE — PROGRESS NOTES
This is a recent snapshot of the patient's [facility] medical record.  For current drug dose and complete information and questions, call [phone] or In Basket pool, fv home infusion ([id number])  CSN Number:  [id number]

## 2018-12-03 NOTE — LETTER
12/3/2018       RE: Noam Eugene  [address]  [place] [address]     Dear Colleague,    Thank you for referring your patient, Noam Eugene, to the [facility]STRO[facility]Y [facility] IBD CLINIC at [facility]. Please see a copy of my visit note below.    IBD CLINIC VISIT     CC/REFERRING MD:  Monica Pop   REASON FOR CONSULTATION: Ulcerative colitis    IBD HISTORY  Age at diagnosis: 19 (3/2010)  Extent of disease: Pancolitis (e4)  Current UC medications: Prednisone taper (currently on 40 mg)  Prior UC surgeries:  Prior IBD Medications:  - Sulfasalazine - worsening of symptoms  - Asacol - No symptomatic improvement   - 6MP - Severe flare requiring prednisone.   - Infliximab 5mg/kg, started summer 2012, developed antibodies    DRUG MONITORING  TPMT enzyme activity: Intermediate   IBD7: pANCA positive (3/22/2010)    6-TGN/6-MMPN levels:    Biologic concentration:  IFX level 11/10/18 = level not detected, antibodies >200    DISEASE ASSESSMENT  Labs:  Recent Labs   Lab Test  11/10/18   1000  09/15/18   0856   CRP  <2.9  <2.9   SED  4  5     Endoscopic assessment: colonoscopy 10/22/18 ileal inflammation, continuous mild pancolitis.  Biopsies show scant superficial small intestinal mucosa with patchy acute inflammation/cryptitis in the ileum.  Colonic and rectal biopsies consistent with mild chronic active colitis.  Biopsies of the cecum and ascending colon show rare noncaseating granulomas identified, negative for dysplasia.  Enterography: --  Fecal calprotectin: pending  C diff: --  pMayo score: 0/9    ASSESSMENT/PLAN  This is a 28-year-old male with presumed ulcerative pancolitis, with recent endoscopic assessment and histologic activity concerning for Crohn's and development of antibodies to infliximab:    1.  Presumed ulcerative pancolitis, with recent endoscopic assessment and histologic activity concerning for Crohn's: Unfortunately patient developed antibodies  to infliximab.  Clinically patient is stable and her most recent endoscopic assessment is consistent with mild disease.  Given patient has done so well in the setting of active high level of antibodies, we will proceed with a trial of 5 ASA (Lialda) and then endoscopically assess if we are able to achieve mucosal healing.  We will also obtain a fecal calprotectin, however patient is currently on prednisone, so this may skew the results.  If we were to need to escalate therapy, adalimumab would be a good option given he did respond to infliximab in the past.  -- Continue prednisone taper for now, call with any return of symptoms (increased frequency of stool, blood in stool, abdominal pain)  -- Start Lialda 4 pills per day  -- Stool sample ASAP (fecal calprotectin)  -- Labs at next visit  -- Likely a flex sig depending on clinical response to 5ASA or a need to escalate therapy.     2.  Possible hidradenitis suppurativa : Noted on outside hospital gastroenterology report.  Unclear if it was true HS, or old scarring.    -- Dermatology referral placed    IBD Health Care Maintenance:    Vaccinations:  All patients on biologics should avoid live vaccines.    -- Influenza (every year): 2018  -- TdaP (every 10 years): 1994  -- Pneumococcal Pneumonia (once plus booster at 5 years): recommend   -- Yearly assessment for latent Tb (verbal screening and exam, PPD or QuantiFERON-Tb testing):   PPD negative 8/11/2010, will need to obtain if restarting biologic    One time confirmation of immunity or serologies:  -- Hepatitis A (serologies or immunizations): not documented  -- Hepatitis B (serologies or immunizations):serologies indicate immunity  -- Varicella: had chicken pox as a child  -- MMR  -- HPV (all aged 18-26)  -- Meningococcal meningitis (all patients at risk for meningitis)-- Due to the immunosuppression in this patient, I would not advise administration of live vaccines such as varicella/VZV, intranasal influenza, MMR,  or yellow fever vaccine (if travelling).      Bone mineral density screening   -- Recommend all patients supplement with calcium and vitamin D    Cancer Screening:  Colon cancer screening:  Given pancolitis, recommend patient undergo regular dysplasia surveillance.    Skin cancer screening: Annual visual exam of skin by dermatologist since patient is immunocompromised    Depression Screening:  -- Over the last month, have you felt down, depressed, or hopeless? No  -- Over the last month, have you felt little interest or pleasure doing things? No    Misc:  -- Avoid tobacco use  -- Avoid NSAIDs as there is potentially a 25% chance of causing an IBD flare    Return to clinic in 4-6 months    Thank you for this consultation.  It was a pleasure to participate in the care of this patient; please contact us with any further questions.      This note was created with voice recognition software, and while reviewed for accuracy, typos may remain.     Fili Rutherford PA-C  Division of Gastroenterology, Hepatology and Nutrition  [facility]       HPI:   28-year-old male who presents today for follow-up.  Patient previously followed with pediatric IBD program then moved to [place] for a few years and return to the [place] this year and established care with Dr. Adkins.  Briefly patient was diagnosed in 2010 with ulcerative pancolitis.  He initially started on sulfasalazine and mesalamine with poor disease control.  He had frequent flares requiring prednisone.  He did well on 6-MP 25 mg a day (he was a heterozygote for TPMT enzyme activity), however he eventually flared and required prednisone.  He was then transitioned to infliximab in 2012. A few years ago, was started to get some symptoms at the end of his 8 week infusion, so went to every 6 weeks.  At some point, his dose was increased and he was placed back on every 8 week infusions. Despite clinically feeling well, a recently colonoscopy was performed  10/22/18 with intention of de-escalation of therpay, however there was evidence of ileitis (question of backwash ileitis) and mild continuous pancolitis. Biopsies show scant superficial small intestinal mucosa with patchy acute inflammation/cryptitis in the ileum.  Colonic and rectal biopsies consistent with mild chronic active colitis.  Biopsies of the cecum and ascending colon show rare noncaseating granulomas identified, negative for dysplasia. An infliximab level was obtained and showed 0 IFX level and high level of antibodies.    Due to a rash that developed on his forearms after his last infusion (which is when the level was drawn), a short prednisone taper was started. He is currently on 40 mg daily and will taper down every 5 days.    Currently he is having 2 stools daily without blood.  He has some urgency in the morning without accidents. No nighttime stools, tenesmus, abdominal pain, nausea, vomiting or fevers.  Weight is stable.    ROS:    No fevers or chills  No weight loss  No blurry vision, double vision or change in vision  No sore throat  No lymphadenopathy  No headache, paraesthesias, or weakness in a limb  No shortness of breath or wheezing  No chest pain or pressure  No arthralgias or myalgias  No rashes or skin changes  No odynophagia or dysphagia  No BRBPR, hematochezia, melena  No dysuria, frequency or urgency  No hot/cold intolerance or polyria  No anxiety or depression    Extra intestinal manifestations of IBD:  No uveitis/episcleritis  No aphthous ulcers   No arthritis   No erythema nodosum/pyoderma gangrenosum.     PERTINENT PAST MEDICAL HISTORY:  Past Medical History:   Diagnosis Date     Ulcerative colitis (H)        PREVIOUS SURGERIES:  Past Surgical History:   Procedure Laterality Date     COLONOSCOPY  7/10/2013    Procedure: COMBINED COLONOSCOPY, SINGLE BIOPSY/POLYPECTOMY BY BIOPSY;;  Surgeon: Julien Stewart MD;  Location: [facility] OR     ESOPHAGOSCOPY, GASTROSCOPY, DUODENOSCOPY (EGD),  "COMBINED  7/10/2013    Procedure: COMBINED ESOPHAGOSCOPY, GASTROSCOPY, DUODENOSCOPY (EGD), BIOPSY SINGLE OR MULTIPLE;  Colonoscopy, EGD, ulcerative colitis;  Surgeon: Julien Stewart MD;  Location: [facility] OR       PREVIOUS ENDOSCOPY:  2013: Normal colon; path with mild to minimal inflammation.     ALLERGIES:   No Known Allergies    PERTINENT MEDICATIONS:    Current Outpatient Prescriptions:      inFLIXimab (REMICADE) 100 MG injection, Inject 1,000 mg into the vein Every 8 weeks, Disp: , Rfl:      predniSONE (DELTASONE) 5 MG tablet, Take 8 tabs (40mg) daily for 5 days, take 6 tabs (30mg) daily for 5 days, then 4 tabs (20mg)daily for 5 days, the 2 tabs (10mg) for 5 days then 1 tab (5mg) for 5 days, then 1 tab  (5mg) every other day for 5 days. Then stop, Disp: 108 tablet, Rfl: 0    SOCIAL HISTORY:  [employer]  , no children  Never smoker    FAMILY HISTORY:  Family History   Problem Relation Age of Onset     Hypertension Father      Colon Cancer No family hx of      No CRC, no IBD  Past/family/social history reviewed and no changes    PHYSICAL EXAMINATION:  Constitutional: aaox3, cooperative, pleasant, not dyspneic/diaphoretic, no acute distress  Vitals reviewed: /71  Pulse 63  Temp 97.5  F (36.4  C) (Oral)  Ht 2.007 m (6' 7\")  Wt 113.4 kg (250 lb 1.6 oz)  SpO2 96%  BMI 28.17 kg/m2  Wt:   Wt Readings from Last 2 Encounters:   10/29/18 111.8 kg (246 lb 6.4 oz)   09/24/18 111.6 kg (246 lb 1.6 oz)      Eyes: Sclera anicteric/injected  Ears/nose/mouth/throat: Normal oropharynx without ulcers or exudate, mucus membranes moist, hearing intact  Neck: supple, thyroid normal size  CV: No edema  Respiratory: Unlabored breathing  Lymph: No axillary, submandibular, supraclavicular or inguinal lymphadenopathy  Abd: Nondistended, +bs, no hepatosplenomegaly, nontender, no peritoneal signs  Skin: warm, perfused, no jaundice  Psych: Normal affect  MSK: Normal gait    PERTINENT " STUDIES:  Most recent CBC:  Recent Labs   Lab Test  11/10/18   1000  09/15/18   0856   WBC  5.2  6.2   HGB  15.1  14.4   HCT  44.8  41.1   PLT  207  175     Most recent hepatic panel:  Recent Labs   Lab Test  11/10/18   1000  09/15/18   0856   ALT  36  46   AST  22  31     Most recent creatinine:  Recent Labs   Lab Test  07/01/14   1406  03/07/12   1152   CR  1.24  0.89     Again, thank you for allowing me to participate in the care of your patient.      Sincerely,    Fili Rutherford PA-C

## 2018-12-03 NOTE — PROGRESS NOTES
IBD CLINIC VISIT     CC/REFERRING MD:  Monica Pop   REASON FOR CONSULTATION: Ulcerative colitis    IBD HISTORY  Age at diagnosis: 19 (3/2010)  Extent of disease: Pancolitis (e4)  Current UC medications: Prednisone taper (currently on 40 mg)  Prior UC surgeries:  Prior IBD Medications:  - Sulfasalazine - worsening of symptoms  - Asacol - No symptomatic improvement   - 6MP - Severe flare requiring prednisone.   - Infliximab 5mg/kg, started summer 2012, developed antibodies    DRUG MONITORING  TPMT enzyme activity: Intermediate   IBD7: pANCA positive (3/22/2010)    6-TGN/6-MMPN levels:    Biologic concentration:  IFX level 11/10/18 = level not detected, antibodies >200    DISEASE ASSESSMENT  Labs:  Recent Labs   Lab Test  11/10/18   1000  09/15/18   0856   CRP  <2.9  <2.9   SED  4  5     Endoscopic assessment: colonoscopy 10/22/18 ileal inflammation, continuous mild pancolitis.  Biopsies show scant superficial small intestinal mucosa with patchy acute inflammation/cryptitis in the ileum.  Colonic and rectal biopsies consistent with mild chronic active colitis.  Biopsies of the cecum and ascending colon show rare noncaseating granulomas identified, negative for dysplasia.  Enterography: --  Fecal calprotectin: pending  C diff: --  pMayo score: 0/9    ASSESSMENT/PLAN  This is a 28-year-old male with presumed ulcerative pancolitis, with recent endoscopic assessment and histologic activity concerning for Crohn's and development of antibodies to infliximab:    1.  Presumed ulcerative pancolitis, with recent endoscopic assessment and histologic activity concerning for Crohn's: Unfortunately patient developed antibodies to infliximab.  Clinically patient is stable and her most recent endoscopic assessment is consistent with mild disease.  Given patient has done so well in the setting of active high level of antibodies, we will proceed with a trial of 5 ASA (Lialda) and then endoscopically assess if we are able to  achieve mucosal healing.  We will also obtain a fecal calprotectin, however patient is currently on prednisone, so this may skew the results.  If we were to need to escalate therapy, adalimumab would be a good option given he did respond to infliximab in the past.  -- Continue prednisone taper for now, call with any return of symptoms (increased frequency of stool, blood in stool, abdominal pain)  -- Start Lialda 4 pills per day  -- Stool sample ASAP (fecal calprotectin)  -- Labs at next visit  -- Likely a flex sig depending on clinical response to 5ASA or a need to escalate therapy.     2.  Possible hidradenitis suppurativa : Noted on outside hospital gastroenterology report.  Unclear if it was true HS, or old scarring.    -- Dermatology referral placed    IBD Health Care Maintenance:    Vaccinations:  All patients on biologics should avoid live vaccines.    -- Influenza (every year): 2018  -- TdaP (every 10 years): 1994  -- Pneumococcal Pneumonia (once plus booster at 5 years): recommend   -- Yearly assessment for latent Tb (verbal screening and exam, PPD or QuantiFERON-Tb testing):   PPD negative 8/11/2010, will need to obtain if restarting biologic    One time confirmation of immunity or serologies:  -- Hepatitis A (serologies or immunizations): not documented  -- Hepatitis B (serologies or immunizations):serologies indicate immunity  -- Varicella: had chicken pox as a child  -- MMR  -- HPV (all aged 18-26)  -- Meningococcal meningitis (all patients at risk for meningitis)-- Due to the immunosuppression in this patient, I would not advise administration of live vaccines such as varicella/VZV, intranasal influenza, MMR, or yellow fever vaccine (if travelling).      Bone mineral density screening   -- Recommend all patients supplement with calcium and vitamin D    Cancer Screening:  Colon cancer screening:  Given pancolitis, recommend patient undergo regular dysplasia surveillance.    Skin cancer screening: Annual  visual exam of skin by dermatologist since patient is immunocompromised    Depression Screening:  -- Over the last month, have you felt down, depressed, or hopeless? No  -- Over the last month, have you felt little interest or pleasure doing things? No    Misc:  -- Avoid tobacco use  -- Avoid NSAIDs as there is potentially a 25% chance of causing an IBD flare    Return to clinic in 4-6 months    Thank you for this consultation.  It was a pleasure to participate in the care of this patient; please contact us with any further questions.      This note was created with voice recognition software, and while reviewed for accuracy, typos may remain.     Fili Rutherford PA-C  Division of Gastroenterology, Hepatology and Nutrition  [facility]       HPI:   28-year-old male who presents today for follow-up.  Patient previously followed with pediatric IBD program then moved to [place] for a few years and return to the [place] this year and established care with Dr. Adkins.  Briefly patient was diagnosed in 2010 with ulcerative pancolitis.  He initially started on sulfasalazine and mesalamine with poor disease control.  He had frequent flares requiring prednisone.  He did well on 6-MP 25 mg a day (he was a heterozygote for TPMT enzyme activity), however he eventually flared and required prednisone.  He was then transitioned to infliximab in 2012. A few years ago, was started to get some symptoms at the end of his 8 week infusion, so went to every 6 weeks.  At some point, his dose was increased and he was placed back on every 8 week infusions. Despite clinically feeling well, a recently colonoscopy was performed 10/22/18 with intention of de-escalation of therpay, however there was evidence of ileitis (question of backwash ileitis) and mild continuous pancolitis. Biopsies show scant superficial small intestinal mucosa with patchy acute inflammation/cryptitis in the ileum.  Colonic and rectal biopsies  consistent with mild chronic active colitis.  Biopsies of the cecum and ascending colon show rare noncaseating granulomas identified, negative for dysplasia. An infliximab level was obtained and showed 0 IFX level and high level of antibodies.    Due to a rash that developed on his forearms after his last infusion (which is when the level was drawn), a short prednisone taper was started. He is currently on 40 mg daily and will taper down every 5 days.    Currently he is having 2 stools daily without blood.  He has some urgency in the morning without accidents. No nighttime stools, tenesmus, abdominal pain, nausea, vomiting or fevers.  Weight is stable.    ROS:    No fevers or chills  No weight loss  No blurry vision, double vision or change in vision  No sore throat  No lymphadenopathy  No headache, paraesthesias, or weakness in a limb  No shortness of breath or wheezing  No chest pain or pressure  No arthralgias or myalgias  No rashes or skin changes  No odynophagia or dysphagia  No BRBPR, hematochezia, melena  No dysuria, frequency or urgency  No hot/cold intolerance or polyria  No anxiety or depression    Extra intestinal manifestations of IBD:  No uveitis/episcleritis  No aphthous ulcers   No arthritis   No erythema nodosum/pyoderma gangrenosum.     PERTINENT PAST MEDICAL HISTORY:  Past Medical History:   Diagnosis Date     Ulcerative colitis (H)        PREVIOUS SURGERIES:  Past Surgical History:   Procedure Laterality Date     COLONOSCOPY  7/10/2013    Procedure: COMBINED COLONOSCOPY, SINGLE BIOPSY/POLYPECTOMY BY BIOPSY;;  Surgeon: Julien Stewart MD;  Location:  OR     ESOPHAGOSCOPY, GASTROSCOPY, DUODENOSCOPY (EGD), COMBINED  7/10/2013    Procedure: COMBINED ESOPHAGOSCOPY, GASTROSCOPY, DUODENOSCOPY (EGD), BIOPSY SINGLE OR MULTIPLE;  Colonoscopy, EGD, ulcerative colitis;  Surgeon: Julien Stewart MD;  Location:  OR       PREVIOUS ENDOSCOPY:  2013: Normal colon; path with mild to minimal inflammation.  "    ALLERGIES:   No Known Allergies    PERTINENT MEDICATIONS:    Current Outpatient Prescriptions:      inFLIXimab (REMICADE) 100 MG injection, Inject 1,000 mg into the vein Every 8 weeks, Disp: , Rfl:      predniSONE (DELTASONE) 5 MG tablet, Take 8 tabs (40mg) daily for 5 days, take 6 tabs (30mg) daily for 5 days, then 4 tabs (20mg)daily for 5 days, the 2 tabs (10mg) for 5 days then 1 tab (5mg) for 5 days, then 1 tab  (5mg) every other day for 5 days. Then stop, Disp: 108 tablet, Rfl: 0    SOCIAL HISTORY:  [employer]  , no children  Never smoker    FAMILY HISTORY:  Family History   Problem Relation Age of Onset     Hypertension Father      Colon Cancer No family hx of      No CRC, no IBD  Past/family/social history reviewed and no changes    PHYSICAL EXAMINATION:  Constitutional: aaox3, cooperative, pleasant, not dyspneic/diaphoretic, no acute distress  Vitals reviewed: /71  Pulse 63  Temp 97.5  F (36.4  C) (Oral)  Ht 2.007 m (6' 7\")  Wt 113.4 kg (250 lb 1.6 oz)  SpO2 96%  BMI 28.17 kg/m2  Wt:   Wt Readings from Last 2 Encounters:   10/29/18 111.8 kg (246 lb 6.4 oz)   09/24/18 111.6 kg (246 lb 1.6 oz)      Eyes: Sclera anicteric/injected  Ears/nose/mouth/throat: Normal oropharynx without ulcers or exudate, mucus membranes moist, hearing intact  Neck: supple, thyroid normal size  CV: No edema  Respiratory: Unlabored breathing  Lymph: No axillary, submandibular, supraclavicular or inguinal lymphadenopathy  Abd: Nondistended, +bs, no hepatosplenomegaly, nontender, no peritoneal signs  Skin: warm, perfused, no jaundice  Psych: Normal affect  MSK: Normal gait    PERTINENT STUDIES:  Most recent CBC:  Recent Labs   Lab Test  11/10/18   1000  09/15/18   0856   WBC  5.2  6.2   HGB  15.1  14.4   HCT  44.8  41.1   PLT  207  175     Most recent hepatic panel:  Recent Labs   Lab Test  11/10/18   1000  09/15/18   0856   ALT  36  46   AST  22  31     Most recent " creatinine:  Recent Labs   Lab Test  07/01/14   1406  03/07/12   1152   CR  1.24  0.89       Answers for HPI/ROS submitted by the patient on 12/3/2018   General Symptoms: No  Skin Symptoms: Yes  HENT Symptoms: No  EYE SYMPTOMS: No  HEART SYMPTOMS: No  LUNG SYMPTOMS: No  INTESTINAL SYMPTOMS: No  URINARY SYMPTOMS: No  REPRODUCTIVE SYMPTOMS: No  SKELETAL SYMPTOMS: No  BLOOD SYMPTOMS: No  NERVOUS SYSTEM SYMPTOMS: No  MENTAL HEALTH SYMPTOMS: No  Changes in hair: No  Changes in moles/birth marks: No  Itching: Yes  Rashes: Yes  Changes in nails: No  Acne: No  Change in facial hair: No  Warts: No  Non-healing sores: No  Scarring: No  Flaking of skin: No  Color changes of hands/feet in cold : No  Sun sensitivity: No  Skin thickening: No

## 2018-12-03 NOTE — MR AVS SNAPSHOT
After Visit Summary   12/3/2018    Noam Eugene    MRN: [id number]           Patient Information     Date Of Birth          1990        Visit Information        Provider Department      12/3/2018 9:00 AM Fili Rutherford PA-C [facility]        Today's Diagnoses     Ulcerative pancolitis without complication (H)    -  1      Care Instructions    It was a pleasure taking care of you today.  I've included a brief summary of our discussion and care plan from today's visit below.  Please review this information with your primary care provider.  ______________________________________________________________________    My recommendations are summarized as follows:    -- Continue prednisone taper for now, call with any return of symptoms (increased frequency of stool, blood in stool, abdominal pain)  -- Start Lialda 4 pills per day  -- Stool sample ASAP  -- Labs at next visit  -- Next endoscopic assessment: To be determined  -- Patient with IBD we recommend supplementation vitamin D 1000 units daily and calcium 500 mg twice daily.  -- Vaccines/immunizations to be updated: tetanus shot  -- No NSAIDs (ibuprofen, or anything containing ibuprofen)       For additional resources about inflammatory bowel disease visit http://[email or web address]      Return to GI Clinic in 4-6 weeks to review your progress.    ______________________________________________________________________    Who do I call with any questions after my visit?  Please be in touch if there are any further questions that arise following today's visit.  There are multiple ways to contact your gastroenterology care team.        During business hours, you may reach a Gastroenterology nurse at [phone], option 3.       To schedule or reschedule an appointment, please call [phone].       You can always send a secure message through MyChart.  MyChart messages are answered by your nurse or  doctor typically within 24 hours.  Please allow extra time on weekends and holidays.        For urgent/emergent questions after business hours, you may reach the on-call GI Fellow by contacting the [facility]  at [phone].      In order for your refill to be processed in a timely fashion, it is your responsibility to ensure you follow the recommendations from your provider regarding your laboratory studies and follow up appointments.       How will I get the results of any tests ordered?    You will receive all of your results.  If you have signed up for Joobilihart, any tests ordered at your visit will be available to you after your physician reviews them.  Typically this takes 1-2 weeks.  If there are urgent results that require a change in your care plan, your physician or nurse will call you to discuss the next steps.      What is Ludei?  Ludei is a secure way for you to access all of your healthcare records from the [facility].  It is a web based computer program, so you can sign on to it from any location.  It also allows you to send secure messages to your care team.  I recommend signing up for Ludei access if you have not already done so and are comfortable with using a computer.      How to I schedule a follow-up visit?  If you did not schedule a follow-up visit today, please call [phone] to schedule a follow-up office visit.        Sincerely,    Fili Rutherford PA-C  [facility]  Division of Gastroenterology                Follow-ups after your visit        Follow-up notes from your care team     Return in about 4 weeks (around 12/31/2018).      Your next 10 appointments already scheduled     Dec 05, 2018  5:15 PM CST   (Arrive by 5:00 PM)   New Patient Visit with Arsenio Velázquez MD   [facility] ([facility])    [address] [place] [id number] [phone]            Feb 18, 2019  7:00 AM CST  "  (Arrive by 6:45 AM)   RETURN INFLAMMATORY BOWEL DISEASE with Fili Rutherford PA-C   [facility] ([facility])    [address] [facility]  [address] [facility] [address] [phone]              Who to contact     Please call your clinic at [phone] to:    Ask questions about your health    Make or cancel appointments    Discuss your medicines    Learn about your test results    Speak to your doctor            Additional Information About Your Visit        Emp[employer] Information     [employer] gives you secure access to your electronic health record. If you see a primary care provider, you can also send messages to your care team and make appointments. If you have questions, please call your primary care clinic.  If you do not have a primary care provider, please call [phone] and they will assist you.      [employer] is an electronic gateway that provides easy, online access to your medical records. With [employer], you can request a clinic appointment, read your test results, renew a prescription or communicate with your care team.     To access your existing account, please contact your [facility] or call [phone] for assistance.        Care EveryWhere ID     This is your Care EveryWhere ID. This could be used by other organizations to access your [facility] medical records  [id number]        Your Vitals Were     Pulse Temperature Height Pulse Oximetry BMI (Body Mass Index)       63 97.5  F (36.4  C) (Oral) 2.007 m (6' 7\") 96% 28.17 kg/m2        Blood Pressure from Last 3 Encounters:   12/03/18 140/71   10/29/18 123/61   09/24/18 134/72    Weight from Last 3 Encounters:   12/03/18 113.4 kg (250 lb 1.6 oz)   10/29/18 111.8 kg (246 lb 6.4 oz)   09/24/18 111.6 kg (246 lb 1.6 oz)              Today, you had the following     No orders found for display         Today's Medication Changes          These changes are accurate as " of 12/3/18  9:38 AM.  If you have any questions, ask your nurse or doctor.               Start taking these medicines.        Dose/Directions    mesalamine 1.2 g EC tablet   Commonly known as:  LIALDA   Used for:  Ulcerative pancolitis without complication (H)   Started by:  Fili Rutherford PA-C        Dose:  4800 mg   Take 4 tablets (4,800 mg) by mouth every morning   Quantity:  180 tablet   Refills:  3            Where to get your medicines      These medications were sent to [id number] - [place], [place] - [address] AT [facility] & [address]  [address], [place] [address]     Phone:  [phone]     mesalamine 1.2 g EC tablet                Primary Care Provider Office Phone # Fax #    Monica Pop [phone]       [facility] AT [place] [address] [address] [facility] [address]        Equal Access to Services     [facility] AH: Hadii cristi ku hadasho Soomaali, waaxda luqadaha, qaybta kaalmada adeegyada, waxay idiin kit quick . So [facility] [phone].    ATENCIÓN: Si habla español, tiene a chi disposición servicios gratuitos de asistencia lingüística. [facility] [phone].    We comply with applicable federal civil rights laws and [place] laws. We do not discriminate on the basis of race, color, national origin, age, disability, sex, sexual orientation, or gender identity.            Thank you!     Thank you for choosing [facility]  for your care. Our goal is always to provide you with excellent care. Hearing back from our patients is one way we can continue to improve our services. Please take a few minutes to complete the written survey that you may receive in the mail after your visit with us. Thank you!             Your Updated Medication List - Protect others around you: Learn how to safely use, store and throw away your medicines at www.disposemymeds.org.          This list is accurate as of 12/3/18   9:38 AM.  Always use your most recent med list.                   Brand Name Dispense Instructions for use Diagnosis    mesalamine 1.2 g EC tablet    LIALDA    180 tablet    Take 4 tablets (4,800 mg) by mouth every morning    Ulcerative pancolitis without complication (H)       predniSONE 5 MG tablet    DELTASONE    108 tablet    Take 8 tabs (40mg) daily for 5 days, take 6 tabs (30mg) daily for 5 days, then 4 tabs (20mg)daily for 5 days, the 2 tabs (10mg) for 5 days then 1 tab (5mg) for 5 days, then 1 tab  (5mg) every other day for 5 days. Then stop    Ulcerative colitis (H)       REMICADE 100 MG injection   Generic drug:  inFLIXimab      Inject 1,000 mg into the vein Every 8 weeks

## 2018-12-03 NOTE — PATIENT INSTRUCTIONS
It was a pleasure taking care of you today.  I've included a brief summary of our discussion and care plan from today's visit below.  Please review this information with your primary care provider.  ______________________________________________________________________    My recommendations are summarized as follows:    -- Continue prednisone taper for now, call with any return of symptoms (increased frequency of stool, blood in stool, abdominal pain)  -- Start Lialda 4 pills per day  -- Stool sample ASAP  -- Labs at next visit  -- Next endoscopic assessment: To be determined  -- Patient with IBD we recommend supplementation vitamin D 1000 units daily and calcium 500 mg twice daily.  -- Vaccines/immunizations to be updated: tetanus shot  -- No NSAIDs (ibuprofen, or anything containing ibuprofen)       For additional resources about inflammatory bowel disease visit http://[email or web address].[email or web address]      Return to GI Clinic in 4-6 weeks to review your progress.    ______________________________________________________________________    Who do I call with any questions after my visit?  Please be in touch if there are any further questions that arise following today's visit.  There are multiple ways to contact your gastroenterology care team.        During business hours, you may reach a Gastroenterology nurse at [phone], option 3.       To schedule or reschedule an appointment, please call [phone].       You can always send a secure message through [employer].  [employer] messages are answered by your nurse or doctor typically within 24 hours.  Please allow extra time on weekends and holidays.        For urgent/emergent questions after business hours, you may reach the on-call GI Fellow by contacting the [facility] at [phone].      In order for your refill to be processed in a timely fashion, it is your responsibility to ensure you follow the recommendations from your provider  regarding your laboratory studies and follow up appointments.       How will I get the results of any tests ordered?    You will receive all of your results.  If you have signed up for pic5hart, any tests ordered at your visit will be available to you after your physician reviews them.  Typically this takes 1-2 weeks.  If there are urgent results that require a change in your care plan, your physician or nurse will call you to discuss the next steps.      What is Nightingalet?  115 network disks is a secure way for you to access all of your healthcare records from the [facility].  It is a web based computer program, so you can sign on to it from any location.  It also allows you to send secure messages to your care team.  I recommend signing up for 115 network disks access if you have not already done so and are comfortable with using a computer.      How to I schedule a follow-up visit?  If you did not schedule a follow-up visit today, please call [phone] to schedule a follow-up office visit.        Sincerely,    Fili Rutherford PA-C  [facility]  Division of Gastroenterology

## 2018-12-03 NOTE — NURSING NOTE
"Chief Complaint   Patient presents with     RECHECK     Return IBD       Vitals:    12/03/18 0903   BP: 140/71   Pulse: 63   Temp: 97.5  F (36.4  C)   TempSrc: Oral   SpO2: 96%   Weight: 113.4 kg (250 lb 1.6 oz)   Height: 2.007 m (6' 7\")       Body mass index is 28.17 kg/(m^2).    AMMON Andrade                          "

## 2018-12-05 ENCOUNTER — OFFICE VISIT (OUTPATIENT)
Dept: DERMATOLOGY | Facility: CLINIC | Age: 28
End: 2018-12-05
Payer: COMMERCIAL

## 2018-12-05 VITALS — SYSTOLIC BLOOD PRESSURE: 125 MMHG | DIASTOLIC BLOOD PRESSURE: 65 MMHG | HEART RATE: 54 BPM

## 2018-12-05 DIAGNOSIS — L81.0 POSTINFLAMMATORY HYPERPIGMENTATION: ICD-10-CM

## 2018-12-05 DIAGNOSIS — L30.9 DERMATITIS: Primary | ICD-10-CM

## 2018-12-05 RX ORDER — TRIAMCINOLONE ACETONIDE 1 MG/G
OINTMENT TOPICAL 2 TIMES DAILY
Qty: 80 G | Refills: 3 | Status: SHIPPED | OUTPATIENT
Start: 2018-12-05 | End: 2019-03-04 | Stop reason: ALTCHOICE

## 2018-12-05 ASSESSMENT — PAIN SCALES - GENERAL: PAINLEVEL: NO PAIN (0)

## 2018-12-05 NOTE — PATIENT INSTRUCTIONS
Zyrtec (cetirizine) 10 mg pills - take 1 pill 1-2 times per day if itching returns  Triamcinolone ointment - apply twice daily if rash reappears

## 2018-12-05 NOTE — MR AVS SNAPSHOT
After Visit Summary   12/5/2018    Noam Eugene    MRN: [id number]           Patient Information     Date Of Birth          1990        Visit Information        Provider Department      12/5/2018 5:15 PM Arsenio Velázquez MD [facility] Dermatology        Today's Diagnoses     Dermatitis    -  1    Postinflammatory hyperpigmentation          Care Instructions    Zyrtec (cetirizine) 10 mg pills - take 1 pill 1-2 times per day if itching returns  Triamcinolone ointment - apply twice daily if rash reappears          Follow-ups after your visit        Your next 10 appointments already scheduled     Jan 14, 2019  8:20 AM CST   (Arrive by 8:05 AM)   RETURN INFLAMMATORY BOWEL DISEASE with [facility] [place]    [address] [place] [id number] [phone]            Feb 18, 2019  7:00 AM CST   (Arrive by 6:45 AM)   RETURN INFLAMMATORY BOWEL DISEASE with [facility] [place]    [address] [place] [address] [phone]              Who to contact     Please call your clinic at [phone] to:    Ask questions about your health    Make or cancel appointments    Discuss your medicines    Learn about your test results    Speak to your doctor            Additional Information About Your Visit        MyChart Information     [employer] gives you secure access to your electronic health record. If you see a primary care provider, you can also send messages to your care team and make appointments. If you have questions, please call your primary care clinic.  If you do not have a primary care provider, please call [phone] and they will assist you.      [employer] is an electronic gateway that provides easy, online access to your medical records. With [employer], you can request a clinic  appointment, read your test results, renew a prescription or communicate with your care team.     To access your existing account, please contact your [facility] or call [phone] for assistance.        Care EveryWhere ID     This is your Care EveryWhere ID. This could be used by other organizations to access your [facility] medical records  [id number]        Your Vitals Were     Pulse                   54            Blood Pressure from Last 3 Encounters:   12/05/18 125/65   12/03/18 140/71   10/29/18 123/61    Weight from Last 3 Encounters:   12/03/18 113.4 kg (250 lb 1.6 oz)   10/29/18 111.8 kg (246 lb 6.4 oz)   09/24/18 111.6 kg (246 lb 1.6 oz)              Today, you had the following     No orders found for display         Today's Medication Changes          These changes are accurate as of 12/5/18 11:59 PM.  If you have any questions, ask your nurse or doctor.               Start taking these medicines.        Dose/Directions    triamcinolone 0.1 % external ointment   Commonly known as:  KENALOG   Used for:  Dermatitis   Started by:  Arsenio Velázquez MD        Apply topically 2 times daily   Quantity:  80 g   Refills:  3            Where to get your medicines      These medications were sent to [id number] - [place], [place] - [address] AT [facility] [address]  [address], [place] [address]     Phone:  [phone]     triamcinolone 0.1 % external ointment                Primary Care Provider Office Phone # Fax #    Monica Pop [phone]       [facility] AT [place] [id number] [facility] [address]        Equal Access to Services     MADI GOMEZ : Hadii cristi Cornejo, waaxda luqadaha, qaybta kaalmada caren, avinash brenner. So [facility] [phone].    ATENCIÓN: Si habla español, tiene a chi disposición servicios gratuitos de asistencia lingüística. Llame al  [phone].    We comply with applicable federal civil rights laws and [place] laws. We do not discriminate on the basis of race, color, national origin, age, disability, sex, sexual orientation, or gender identity.            Thank you!     Thank you for choosing [facility] DERMATOLOGY  for your care. Our goal is always to provide you with excellent care. Hearing back from our patients is one way we can continue to improve our services. Please take a few minutes to complete the written survey that you may receive in the mail after your visit with us. Thank you!             Your Updated Medication List - Protect others around you: Learn how to safely use, store and throw away your medicines at www.disposemymeds.org.          This list is accurate as of 12/5/18 11:59 PM.  Always use your most recent med list.                   Brand Name Dispense Instructions for use Diagnosis    mesalamine 1.2 g EC tablet    LIALDA    180 tablet    Take 4 tablets (4,800 mg) by mouth every morning    Ulcerative pancolitis without complication (H)       predniSONE 5 MG tablet    DELTASONE    108 tablet    Take 8 tabs (40mg) daily for 5 days, take 6 tabs (30mg) daily for 5 days, then 4 tabs (20mg)daily for 5 days, the 2 tabs (10mg) for 5 days then 1 tab (5mg) for 5 days, then 1 tab  (5mg) every other day for 5 days. Then stop    Ulcerative colitis (H)       REMICADE 100 MG injection   Generic drug:  inFLIXimab      Inject 1,000 mg into the vein Every 8 weeks        triamcinolone 0.1 % external ointment    KENALOG    80 g    Apply topically 2 times daily    Dermatitis

## 2018-12-05 NOTE — NURSING NOTE
"Chief Complaint   Patient presents with     Derm Problem     Manny is here today for a rash on his arms. He states \" The rash spread to his lower back and abdomen. I was started on prednisone and the rash is improving\"     Chiara Bull, RMA  "

## 2018-12-05 NOTE — LETTER
"12/5/2018       RE: Noam Eugene  [address]  [place] [address]     Dear Colleague,    Thank you for referring your patient, Noam Eugene, to the [facility] DERMATOLOGY at [facility]. Please see a copy of my visit note below.    [facility] Dermatology Note      Dermatology Problem List:  1. Urticarial versus eczematous eruption following infliximab: resolved on exam with prednisone given by referring provider, no photos. Recommended sending photos if rash recurs and starting triamcinolone and cetirizine.    CC:   Chief Complaint   Patient presents with     Derm Problem     Manny is here today for a rash on his arms. He states \" The rash spread to his lower back and abdomen. I was started on prednisone and the rash is improving\"         Encounter Date: Dec 5, 2018    History of Present Illness:  Mr. Noam Eugene is a 28 year old male who presents for a rash on the forearms. Today, the pt reports that he has a personal history of ulcerative colitis and was receiving infliximab infusions. The rash appeared within a couple of days of the most recent infusion, with red, itchy bumps on the forearms that spread to the abdomen and flanks. He started prednisone 5 days ago (started at 40 mg daily, now at 30). Since starting this medication, the rash seems to have resolved. He has not had recurrence since starting prednisone. No prior history of similar rashes.    No other skin concerns to address today.     Past Medical History:   Past Medical History:   Diagnosis Date     Ulcerative colitis (H)      Past Surgical History:   Procedure Laterality Date     COLONOSCOPY  7/10/2013    Procedure: COMBINED COLONOSCOPY, SINGLE BIOPSY/POLYPECTOMY BY BIOPSY;;  Surgeon: Julien Setwart MD;  Location:  OR     ESOPHAGOSCOPY, GASTROSCOPY, DUODENOSCOPY (EGD), COMBINED  7/10/2013    Procedure: COMBINED ESOPHAGOSCOPY, GASTROSCOPY, DUODENOSCOPY (EGD), BIOPSY SINGLE " OR MULTIPLE;  Colonoscopy, EGD, ulcerative colitis;  Surgeon: Julien Stewart MD;  Location:  OR       Social History:   reports that he has never smoked. He has never used smokeless tobacco. He reports that he drinks alcohol. He reports that he does not use illicit drugs.    Family History:  Family History   Problem Relation Age of Onset     Hypertension Father      Colon Cancer No family hx of        Medications:  Current Outpatient Prescriptions   Medication Sig Dispense Refill     inFLIXimab (REMICADE) 100 MG injection Inject 1,000 mg into the vein Every 8 weeks       mesalamine (LIALDA) 1.2 g EC tablet Take 4 tablets (4,800 mg) by mouth every morning 180 tablet 3     predniSONE (DELTASONE) 5 MG tablet Take 8 tabs (40mg) daily for 5 days, take 6 tabs (30mg) daily for 5 days, then 4 tabs (20mg)daily for 5 days, the 2 tabs (10mg) for 5 days then 1 tab (5mg) for 5 days, then 1 tab  (5mg) every other day for 5 days. Then stop 108 tablet 0     No Known Allergies      Review of Systems:  - Constitutional: The patient denies fatigue, fevers, chills, unintended weight loss, and night sweats.  - HEENT: Patient denies nonhealing oral sores.  - Skin: As above in HPI. No additional skin concerns.  - MSK: no arthralgias or myalgias  - GI: improving diarrhea    Physical exam:  Vitals: /65  Pulse 54  GEN: This is a well developed, well-nourished male in no acute distress, in a pleasant mood.    SKIN: Mcmullen phototype II  - Examination of the face, neck, scalp, chest, back, abdomen, arms, hands including nails/digits, legs was performed. Declined genital/buttocks exam.  - On the ventral foreamrs extending to the distal upper arms there are faint erythematous coalescing macules and patches. Faint erythematous patches on the flanks and lower back  - No other lesions of concern on areas examined.     Impression/Plan:  1. Suspected urticaria versus eczematous reaction, now resolve: query in response to infliximab given  timeline of rash onset, now resolved with prednisone taper. Patient did not have photos when the rash appeared; current clinical appearance is nonspecific and demonstrates postinflammatory hyperpigmentation.    Advised the pt to call us if the rash recurs. At that time, can start triamcinolone 0.1% cream BID to the areas affected with the rash.    If the rash comes back, start cetrizine 10 mg daily.    If rash recurs, advised patient to photograph and submit online via [employer] for further assessment.    Continue prednisone taper as prescribed.      Follow-up prn for new or changing lesions.       Staff Involved:  Staff/Scribe    Scribe Disclosure  I, Tristian Latif, am serving as a scribe to document services personally performed by Dr. Arsenio Velázquez MD, based on data collection and the provider's statements to me.     Provider Disclosure:   The documentation recorded by the scribe accurately reflects the services I personally performed and the decisions made by me.      Arsenio Velázquez MD

## 2018-12-05 NOTE — PROGRESS NOTES
"[facility] Dermatology Note      Dermatology Problem List:  1. Urticarial versus eczematous eruption following infliximab: resolved on exam with prednisone given by referring provider, no photos. Recommended sending photos if rash recurs and starting triamcinolone and cetirizine.    CC:   Chief Complaint   Patient presents with     Derm Problem     Manny is here today for a rash on his arms. He states \" The rash spread to his lower back and abdomen. I was started on prednisone and the rash is improving\"         Encounter Date: Dec 5, 2018    History of Present Illness:  Mr. Noam Eugene is a 28 year old male who presents for a rash on the forearms. Today, the pt reports that he has a personal history of ulcerative colitis and was receiving infliximab infusions. The rash appeared within a couple of days of the most recent infusion, with red, itchy bumps on the forearms that spread to the abdomen and flanks. He started prednisone 5 days ago (started at 40 mg daily, now at 30). Since starting this medication, the rash seems to have resolved. He has not had recurrence since starting prednisone. No prior history of similar rashes.    No other skin concerns to address today.     Past Medical History:   Past Medical History:   Diagnosis Date     Ulcerative colitis (H)      Past Surgical History:   Procedure Laterality Date     COLONOSCOPY  7/10/2013    Procedure: COMBINED COLONOSCOPY, SINGLE BIOPSY/POLYPECTOMY BY BIOPSY;;  Surgeon: Julien Stewart MD;  Location:  OR     ESOPHAGOSCOPY, GASTROSCOPY, DUODENOSCOPY (EGD), COMBINED  7/10/2013    Procedure: COMBINED ESOPHAGOSCOPY, GASTROSCOPY, DUODENOSCOPY (EGD), BIOPSY SINGLE OR MULTIPLE;  Colonoscopy, EGD, ulcerative colitis;  Surgeon: Julien Stewart MD;  Location:  OR       Social History:   reports that he has never smoked. He has never used smokeless tobacco. He reports that he drinks alcohol. He reports that he does not use illicit drugs.    Family " History:  Family History   Problem Relation Age of Onset     Hypertension Father      Colon Cancer No family hx of        Medications:  Current Outpatient Prescriptions   Medication Sig Dispense Refill     inFLIXimab (REMICADE) 100 MG injection Inject 1,000 mg into the vein Every 8 weeks       mesalamine (LIALDA) 1.2 g EC tablet Take 4 tablets (4,800 mg) by mouth every morning 180 tablet 3     predniSONE (DELTASONE) 5 MG tablet Take 8 tabs (40mg) daily for 5 days, take 6 tabs (30mg) daily for 5 days, then 4 tabs (20mg)daily for 5 days, the 2 tabs (10mg) for 5 days then 1 tab (5mg) for 5 days, then 1 tab  (5mg) every other day for 5 days. Then stop 108 tablet 0     No Known Allergies      Review of Systems:  - Constitutional: The patient denies fatigue, fevers, chills, unintended weight loss, and night sweats.  - HEENT: Patient denies nonhealing oral sores.  - Skin: As above in HPI. No additional skin concerns.  - MSK: no arthralgias or myalgias  - GI: improving diarrhea    Physical exam:  Vitals: /65  Pulse 54  GEN: This is a well developed, well-nourished male in no acute distress, in a pleasant mood.    SKIN: Mcmullen phototype II  - Examination of the face, neck, scalp, chest, back, abdomen, arms, hands including nails/digits, legs was performed. Declined genital/buttocks exam.  - On the ventral foreamrs extending to the distal upper arms there are faint erythematous coalescing macules and patches. Faint erythematous patches on the flanks and lower back  - No other lesions of concern on areas examined.     Impression/Plan:  1. Suspected urticaria versus eczematous reaction, now resolve: query in response to infliximab given timeline of rash onset, now resolved with prednisone taper. Patient did not have photos when the rash appeared; current clinical appearance is nonspecific and demonstrates postinflammatory hyperpigmentation.    Advised the pt to call us if the rash recurs. At that time, can start  triamcinolone 0.1% cream BID to the areas affected with the rash.    If the rash comes back, start cetrizine 10 mg daily.    If rash recurs, advised patient to photograph and submit online via [employer] for further assessment.    Continue prednisone taper as prescribed.      Follow-up prn for new or changing lesions.       Staff Involved:  Staff/Scribe    Scribe Disclosure  I, Tristian Latif, am serving as a scribe to document services personally performed by Dr. Arsenio Velázquez MD, based on data collection and the provider's statements to me.     Provider Disclosure:   The documentation recorded by the scribe accurately reflects the services I personally performed and the decisions made by me.    Arsenio Velázquez MD   of Dermatology  Department of Dermatology  [facility] [employer]

## 2018-12-08 DIAGNOSIS — R19.7 DIARRHEA DUE TO MALABSORPTION: ICD-10-CM

## 2018-12-08 DIAGNOSIS — K90.9 DIARRHEA DUE TO MALABSORPTION: ICD-10-CM

## 2018-12-08 PROCEDURE — 83993 ASSAY FOR CALPROTECTIN FECAL: CPT | Performed by: PHYSICIAN ASSISTANT

## 2018-12-11 LAB — CALPROTECTIN STL-MCNT: 62.4 MG/KG (ref 0–49.9)

## 2018-12-14 ENCOUNTER — PATIENT OUTREACH (OUTPATIENT)
Dept: GASTROENTEROLOGY | Facility: CLINIC | Age: 28
End: 2018-12-14

## 2018-12-14 DIAGNOSIS — K51.90 ULCERATIVE COLITIS (H): Primary | ICD-10-CM

## 2018-12-14 RX ORDER — PREDNISONE 5 MG/1
TABLET ORAL
Qty: 73 TABLET | Refills: 0 | Status: SHIPPED | OUTPATIENT
Start: 2018-12-14 | End: 2019-03-04 | Stop reason: ALTCHOICE

## 2018-12-14 NOTE — PROGRESS NOTES
Pt called to report that yesterday having 3 stools 2 loose and 1 diarrhea and blood in one stool.  Abdominal pain that started at 1 pm and continued until 8 pm. Rates it at 4 or 5.  Had tapered down to 10 mg. Today  stools are loose to diarrhea and abdominal   pain .  Pain is right lower quadrant. Discussed with Ms Wesmerari and will start back up at 20 mg of prednisone  and taper by 5. New prescription. .Pt will update next week.

## 2018-12-20 ENCOUNTER — PATIENT OUTREACH (OUTPATIENT)
Dept: GASTROENTEROLOGY | Facility: CLINIC | Age: 28
End: 2018-12-20

## 2018-12-20 DIAGNOSIS — K51.00 PANCOLITIS (H): Primary | ICD-10-CM

## 2018-12-20 RX ORDER — PREDNISONE 5 MG/1
TABLET ORAL
Qty: 225 TABLET | Refills: 0 | Status: SHIPPED | OUTPATIENT
Start: 2018-12-20 | End: 2019-03-04

## 2018-12-20 NOTE — PROGRESS NOTES
Pt called asking for refill on prednisone. Pt said he missed a call from Dr. Adkins. Pt said that  for research called him and discussed being in a trial. Pt wondering how soon that will happen. Let him know that I would check with Dr. Adkins . discussed prednisone with Ms. Rutherford. Will send rx for prednisone 20 mg until directly to taper.Routed to Dr. Adkins,   .

## 2018-12-21 ENCOUNTER — PATIENT OUTREACH (OUTPATIENT)
Dept: GASTROENTEROLOGY | Facility: CLINIC | Age: 28
End: 2018-12-21

## 2018-12-21 NOTE — PROGRESS NOTES
Discussed pt with Dr. Adkins.   Pt will stay on 20 mg of Prednsione. Pt will be enrolled in research study in early January. Pt having some joint pain which he feels it worse depending on foods that he eats. Pt has meet with the nutritionist. Pt will keep a diary of foods and symptoms.  Will call if joint pain increases.i

## 2019-01-07 ENCOUNTER — TELEPHONE (OUTPATIENT)
Dept: GASTROENTEROLOGY | Facility: CLINIC | Age: 29
End: 2019-01-07

## 2019-01-07 NOTE — TELEPHONE ENCOUNTER
Pt had called and left a message about that he had questions about research. Left a message for pt that he should have received a packet for research information and that Dr. Apodaca sent out a message to research coordinator to reach him.  Pharmacy liaison is looking at pricing of different meslamines. Left my name and number.

## 2019-01-10 ENCOUNTER — TELEPHONE (OUTPATIENT)
Dept: GASTROENTEROLOGY | Facility: CLINIC | Age: 29
End: 2019-01-10

## 2019-01-10 NOTE — TELEPHONE ENCOUNTER
Called and spoke with patient advising him appointment for 1/14/19 at 8:20AM can be canceled as per last office visit not from 12/3/18, is to follow up in the next 4-6 months unless otherwise needed.

## 2019-01-24 ENCOUNTER — DOCUMENTATION ONLY (OUTPATIENT)
Dept: GASTROENTEROLOGY | Facility: CLINIC | Age: 29
End: 2019-01-24

## 2019-01-24 NOTE — PROGRESS NOTES
[facility]   INFLAMMATORY BOWEL DISEASE PROGRAM  STUDY VISIT PROGRESS NOTE     Study: 4014 - Enterpret: A Randomized controlled trial of two dosing strategies for vedolizumab for ulcerative colitis; IRB: [id number]     Week: Screening     Subjective:  Previously on infliximab for > 4 years, but stopped due to antibody development.    Was recently flaring, although made some diet changes and now only having 1-2 stools a day without blood (baseline 1 stool a day).  Just recently improved.      Will continue screening.  Discussed that he would need to have moderate to severe flare to enter study.       Physician assessment:   PGA: 1    Specialized checklist performed:   PML checklist completed: PML not suspected at this time     Physical exam:   A complete physical exam was performed without abnormalities.      Constitutional: aaox3, cooperative, pleasant, not dyspneic/diaphoretic, no acute distress  Eyes: Sclera anicteric/injected  Ears/nose/mouth/throat: Normal oropharynx without ulcers or exudate, mucus membranes moist, hearing intact  Neck: supple, thyroid normal size  CV: No edema  Respiratory: Unlabored breathing  Lymph: No axillary, submandibular, supraclavicular or inguinal lymphadenopathy  Abd: Nondistended, +bs, no hepatosplenomegaly, nontender, no peritoneal signs  Skin: warm, perfused, no jaundice  Psych: Normal affect  MSK: Normal gait      Plan:   -- Continue screening per study protocol   -- Discussed that if he is not symptomatic over screening period, he would screen fail.  If he were to flare - we could re-screen.

## 2019-02-01 DIAGNOSIS — Z00.6 EXAMINATION OF PARTICIPANT OR CONTROL IN CLINICAL RESEARCH: Primary | ICD-10-CM

## 2019-02-08 ENCOUNTER — TELEPHONE (OUTPATIENT)
Dept: GASTROENTEROLOGY | Facility: CLINIC | Age: 29
End: 2019-02-08

## 2019-02-08 NOTE — TELEPHONE ENCOUNTER
VM with request pt contact Endoscopy Pre-assessment RN to review upcoming procedure information on Friday Feb 8th.      Telephone call-back number provided.  , RN  [facility] Endoscopy    Additional Information regarding appointment:   Patient scheduled for:  Flexible Sigmoidoscopy  Indication for procedure:   Order Questions     Question Answer Comment   Procedure Flexible Sigmoidoscopy With Dr. Adkins - study team to coordiante   Purpose of Procedure Diagnostic    Does the patient have the following? None    Is the patient on the following medications? None    Preferred Location [facility] [phone]           Associated Diagnoses     Examination of participant or control in clinical research [Z00.6]  - Primary           Date/Arrival time: Friday 2/15 @ 9 am  Procedure Provider:    Referring Provider. Yossi Adkins  Prep Type:   Instructions provided by study   Facility location:    [x][address], R[address]    Anticoagulants or blood thinners: no

## 2019-02-11 ENCOUNTER — TELEPHONE (OUTPATIENT)
Dept: GASTROENTEROLOGY | Facility: CLINIC | Age: 29
End: 2019-02-11

## 2019-02-15 ENCOUNTER — HOSPITAL ENCOUNTER (OUTPATIENT)
Facility: CLINIC | Age: 29
Discharge: HOME | End: 2019-02-15
Attending: INTERNAL MEDICINE | Admitting: INTERNAL MEDICINE

## 2019-02-15 VITALS
DIASTOLIC BLOOD PRESSURE: 75 MMHG | SYSTOLIC BLOOD PRESSURE: 135 MMHG | RESPIRATION RATE: 12 BRPM | OXYGEN SATURATION: 97 % | HEART RATE: 67 BPM

## 2019-02-15 LAB — FLEXIBLE SIGMOIDOSCOPY: NORMAL

## 2019-02-15 PROCEDURE — 99152 MOD SED SAME PHYS/QHP 5/>YRS: CPT | Performed by: INTERNAL MEDICINE

## 2019-02-15 PROCEDURE — G0500 MOD SEDAT ENDO SERVICE >5YRS: HCPCS | Performed by: INTERNAL MEDICINE

## 2019-02-15 PROCEDURE — 45380 COLONOSCOPY AND BIOPSY: CPT | Performed by: INTERNAL MEDICINE

## 2019-02-15 PROCEDURE — 40000141 ZZH STATISTIC PERIPHERAL IV START W/O US GUIDANCE

## 2019-02-15 PROCEDURE — 25000128 H RX IP 250 OP 636: Performed by: INTERNAL MEDICINE

## 2019-02-15 RX ORDER — FENTANYL CITRATE 50 UG/ML
INJECTION, SOLUTION INTRAMUSCULAR; INTRAVENOUS PRN
Status: DISCONTINUED | OUTPATIENT
Start: 2019-02-15 | End: 2019-02-15 | Stop reason: HOSPADM

## 2019-02-15 RX ORDER — LIDOCAINE 40 MG/G
CREAM TOPICAL
Status: DISCONTINUED | OUTPATIENT
Start: 2019-02-15 | End: 2019-02-15 | Stop reason: HOSPADM

## 2019-02-15 RX ORDER — ONDANSETRON 2 MG/ML
4 INJECTION INTRAMUSCULAR; INTRAVENOUS
Status: DISCONTINUED | OUTPATIENT
Start: 2019-02-15 | End: 2019-02-15 | Stop reason: HOSPADM

## 2019-02-15 NOTE — OR NURSING
PT here for a flex sigmoidoscopy with biopsies for research purposes.  Pt got conscious sedation. Pt on 2L of oxygen.  Pt did well with procedure Parisa Saldana RN on 2/15/2019 at 10:49 AM

## 2019-02-15 NOTE — DISCHARGE INSTRUCTIONS
Discharge Instructions after Colonoscopy  or Sigmoidoscopy    Today you had a  Sigmoidoscopy    Activity and Diet  You were given medicine for pain. You may be dizzy or sleepy.  For 24 hours:    Do not drive or use heavy equipment.    Do not make important decisions.    Do not drink any alcohol.  You may return to your normal diet and medicines.    Discomfort    Air was placed in your colon during the exam in order to see it. Walking helps to pass the air.    You may take Tylenol (acetaminophen) for pain unless your doctor has told you not to.  Do not take aspirin or ibuprofen (Advil, Motrin, or other anti-inflammatory  drugs) for ___3__ days.    Follow-up  _x___ We took small tissue samples or polyps to study. Your doctor will call you with the results  within two weeks.    When to call:    Call right away if you have:    Unusual pain in belly or chest pain not relieved with passing air.    More than 1 to 2 Tablespoons of bleeding from your rectum.    Fever above 100.6  F (37.5  C).    If you have severe pain, bleeding, or shortness of breath, go to an emergency room.    If you have questions, call:  Monday to Friday, 7 a.m. to 4:30 p.m.  Endoscopy: [phone] (We may have to call you back)    After hours  Hospital: [phone] (Ask for the GI fellow on call)

## 2019-02-18 NOTE — ADDENDUM NOTE
Encounter addended by: Fatoumata Jensen RN on: 2/18/2019 7:14 AM   Actions taken: Charge Capture section accepted

## 2019-02-28 ENCOUNTER — TELEPHONE (OUTPATIENT)
Dept: GASTROENTEROLOGY | Facility: CLINIC | Age: 29
End: 2019-02-28

## 2019-03-04 ENCOUNTER — OFFICE VISIT (OUTPATIENT)
Dept: GASTROENTEROLOGY | Facility: CLINIC | Age: 29
End: 2019-03-04
Payer: COMMERCIAL

## 2019-03-04 VITALS
OXYGEN SATURATION: 100 % | TEMPERATURE: 97.4 F | HEIGHT: 78 IN | SYSTOLIC BLOOD PRESSURE: 114 MMHG | WEIGHT: 245.2 LBS | BODY MASS INDEX: 28.37 KG/M2 | RESPIRATION RATE: 16 BRPM | HEART RATE: 50 BPM | DIASTOLIC BLOOD PRESSURE: 58 MMHG

## 2019-03-04 DIAGNOSIS — K51.00 ULCERATIVE PANCOLITIS WITHOUT COMPLICATION (H): Primary | ICD-10-CM

## 2019-03-04 SDOH — HEALTH STABILITY: MENTAL HEALTH: HOW OFTEN DO YOU HAVE A DRINK CONTAINING ALCOHOL?: MONTHLY OR LESS

## 2019-03-04 ASSESSMENT — PAIN SCALES - GENERAL: PAINLEVEL: NO PAIN (0)

## 2019-03-04 ASSESSMENT — MIFFLIN-ST. JEOR: SCORE: 2257.22

## 2019-03-04 NOTE — LETTER
3/4/2019       RE: Noam Eugene  [address]  [place] [address]     Dear Colleague,    Thank you for referring your patient, Noam Eugene, to the [facility] GASTRO[facility] AND IBD CLINIC at [facility]. Please see a copy of my visit note below.    IBD CLINIC VISIT     CC/REFERRING MD:  Monica Pop   REASON FOR CONSULTATION: Ulcerative colitis    IBD HISTORY  Age at diagnosis: 19 (3/2010)  Extent of disease: Pancolitis (e4)  Current UC medications: none  Prior UC surgeries:  Prior IBD Medications:  - Sulfasalazine - worsening of symptoms  - Asacol - No symptomatic improvement   - 6MP - Severe flare requiring prednisone.   - Infliximab 5mg/kg, started summer 2012, developed antibodies    DRUG MONITORING  TPMT enzyme activity: Intermediate   IBD7: pANCA positive (3/22/2010)    6-TGN/6-MMPN levels:    Biologic concentration:  IFX level 11/10/18 = level not detected, antibodies >200    DISEASE ASSESSMENT  Labs:  Recent Labs   Lab Test 11/10/18  1000 09/15/18  0856   CRP <2.9 <2.9   SED 4 5     Endoscopic assessment: flex sig 2/2019 mild (devlin score 1), left sided UC. No bxs.  Enterography: --  Fecal calprotectin: 62.4 (12/2018)  C diff: --  pMayo score: 1/12    ASSESSMENT/PLAN  This is a 28-year-old male with presumed ulcerative pancolitis, with recent endoscopic assessment and histologic activity concerning for Crohn's and development of antibodies to infliximab:    1.  Presumed ulcerative pancolitis, with recent endoscopic assessment and histologic activity concerning for Crohn's: Unfortunately patient developed antibodies to infliximab and plan was to transition to 5ASA.  He was also evaluated for Enterpret trial, current does not meet criteria.  Did not resume lialda and is not on any maintenance therapy currently, but doing well.  Recent flex sig with mild left sided disease.  We discussed restarting mesalamine therapy.  Lialda quite expensive, so we  are looking into alternative more affordable options. If we were to need to escalate therapy, would rescreen for enterpret trial or consider adalimumab would be a good option given he did respond to infliximab in the past.  -- Restart Lialda 4 pills per day (or affordable mesalamine alternative)  -- Will require annual creatinine while on mesalamine therapy (obtain at next visit)  -- Can use fecal calprotectin to trend for following inflammation     2.  Possible hidradenitis suppurativa : Noted on outside hospital gastroenterology report.  Unclear if it was true HS, or old scarring.    -- Dermatology referral placed    3. IBD Health Care Maintenance:    Vaccinations:  All patients on biologics should avoid live vaccines.    -- Influenza (every year): 2018  -- TdaP (every 10 years): 1994  -- Pneumococcal Pneumonia (once plus booster at 5 years): recommend   -- Yearly assessment for latent Tb (verbal screening and exam, PPD or QuantiFERON-Tb testing):   PPD negative 8/11/2010, will need to obtain if restarting biologic    One time confirmation of immunity or serologies:  -- Hepatitis A (serologies or immunizations): not documented  -- Hepatitis B (serologies or immunizations):serologies indicate immunity  -- Varicella: had chicken pox as a child  -- MMR  -- HPV (all aged 18-26)  -- Meningococcal meningitis (all patients at risk for meningitis)-- Due to the immunosuppression in this patient, I would not advise administration of live vaccines such as varicella/VZV, intranasal influenza, MMR, or yellow fever vaccine (if travelling).      Bone mineral density screening   -- Recommend all patients supplement with calcium and vitamin D    Cancer Screening:  Colon cancer screening:  Given pancolitis, recommend patient undergo regular dysplasia surveillance.    Skin cancer screening: Annual visual exam of skin by dermatologist since patient is immunocompromised    Depression Screening:  -- Over the last month, have you felt  down, depressed, or hopeless? No  -- Over the last month, have you felt little interest or pleasure doing things? No    Misc:  -- Avoid tobacco use  -- Avoid NSAIDs as there is potentially a 25% chance of causing an IBD flare    Return to clinic in 4-6 months    Thank you for this consultation.  It was a pleasure to participate in the care of this patient; please contact us with any further questions.      This note was created with voice recognition software, and while reviewed for accuracy, typos may remain.       HPI:   28-year-old male who presents today for follow-up.  Patient previously followed with pediatric IBD program then moved to [place] for a few years and return to the [place] this year and established care with Dr. Adkins.  Briefly patient was diagnosed in 2010 with ulcerative pancolitis.  He initially started on sulfasalazine and mesalamine with poor disease control.  He had frequent flares requiring prednisone.  He did well on 6-MP 25 mg a day (he was a heterozygote for TPMT enzyme activity), however he eventually flared and required prednisone.  He was then transitioned to infliximab in 2012. A few years ago, was started to get some symptoms at the end of his 8 week infusion, so went to every 6 weeks.  At some point, his dose was increased and he was placed back on every 8 week infusions. Despite clinically feeling well, a recently colonoscopy was performed 10/22/18 with intention of de-escalation of therpay, however there was evidence of ileitis (question of backwash ileitis) and mild continuous pancolitis. Biopsies show scant superficial small intestinal mucosa with patchy acute inflammation/cryptitis in the ileum.  Colonic and rectal biopsies consistent with mild chronic active colitis.  Biopsies of the cecum and ascending colon show rare noncaseating granulomas identified, negative for dysplasia. An infliximab level was obtained and showed 0 IFX level and high level of antibodies.    Due  to a rash that developed on his forearms after his last infusion (which is when the level was drawn), a short prednisone taper was started. He completed his prednisone taper without recurrence of any symptoms.    Currently he is having 2 stools daily without blood.  He has some urgency in the morning without accidents. No nighttime stools, tenesmus, abdominal pain, nausea, vomiting or fevers.  Weight is stable.    ROS:    No fevers or chills  No weight loss  No blurry vision, double vision or change in vision  No sore throat  No lymphadenopathy  No headache, paraesthesias, or weakness in a limb  No shortness of breath or wheezing  No chest pain or pressure  No arthralgias or myalgias  No rashes or skin changes  No odynophagia or dysphagia  No BRBPR, hematochezia, melena  No dysuria, frequency or urgency  No hot/cold intolerance or polyria  No anxiety or depression    Extra intestinal manifestations of IBD:  No uveitis/episcleritis  No aphthous ulcers   No arthritis   No erythema nodosum/pyoderma gangrenosum.     PERTINENT PAST MEDICAL HISTORY:  Past Medical History:   Diagnosis Date     Ulcerative colitis (H)        PREVIOUS SURGERIES:  Past Surgical History:   Procedure Laterality Date     COLONOSCOPY  7/10/2013    Procedure: COMBINED COLONOSCOPY, SINGLE BIOPSY/POLYPECTOMY BY BIOPSY;;  Surgeon: Julien Stewart MD;  Location:  OR     COLONOSCOPY N/A 2/15/2019    Procedure: COMBINED COLONOSCOPY, SINGLE OR MULTIPLE BIOPSY/POLYPECTOMY BY BIOPSY;  Surgeon: Tori Luque MD;  Location:  GI     ESOPHAGOSCOPY, GASTROSCOPY, DUODENOSCOPY (EGD), COMBINED  7/10/2013    Procedure: COMBINED ESOPHAGOSCOPY, GASTROSCOPY, DUODENOSCOPY (EGD), BIOPSY SINGLE OR MULTIPLE;  Colonoscopy, EGD, ulcerative colitis;  Surgeon: Julien Stewart MD;  Location:  OR       PREVIOUS ENDOSCOPY:  2013: Normal colon; path with mild to minimal inflammation.     colonoscopy 10/22/18 ileal inflammation, continuous mild pancolitis.  Biopsies show scant  "superficial small intestinal mucosa with patchy acute inflammation/cryptitis in the ileum.  Colonic and rectal biopsies consistent with mild chronic active colitis.  Biopsies of the cecum and ascending colon show rare noncaseating granulomas identified, negative for dysplasia.      ALLERGIES:   No Known Allergies    PERTINENT MEDICATIONS:    Current Outpatient Medications:      inFLIXimab (REMICADE) 100 MG injection, Inject 1,000 mg into the vein Every 8 weeks, Disp: , Rfl:      mesalamine (LIALDA) 1.2 g EC tablet, Take 4 tablets (4,800 mg) by mouth every morning (Patient not taking: Reported on 3/4/2019), Disp: 180 tablet, Rfl: 3     triamcinolone (KENALOG) 0.1 % external ointment, Apply topically 2 times daily (Patient not taking: Reported on 3/4/2019), Disp: 80 g, Rfl: 3    SOCIAL HISTORY:  [employer] ([employer])  , no children  Never smoker    FAMILY HISTORY:  Family History   Problem Relation Age of Onset     Hypertension Father      Colon Cancer No family hx of      No CRC, no IBD  Past/family/social history reviewed and no changes    PHYSICAL EXAMINATION:  Constitutional: aaox3, cooperative, pleasant, not dyspneic/diaphoretic, no acute distress  Vitals reviewed: /58 (BP Location: Left arm, Patient Position: Sitting, Cuff Size: Adult Large)   Pulse 50   Temp 97.4  F (36.3  C) (Oral)   Resp 16   Ht 2.032 m (6' 8\")   Wt 111.2 kg (245 lb 3.2 oz)   SpO2 100%   BMI 26.94 kg/m     Wt:   Wt Readings from Last 2 Encounters:   03/04/19 111.2 kg (245 lb 3.2 oz)   12/03/18 113.4 kg (250 lb 1.6 oz)      Eyes: Sclera anicteric/injected  Ears/nose/mouth/throat: Normal oropharynx without ulcers or exudate, mucus membranes moist, hearing intact  Neck: supple, thyroid normal size  CV: No edema  Respiratory: Unlabored breathing  Lymph: No axillary, submandibular, supraclavicular or inguinal lymphadenopathy  Abd: Nondistended, +bs, no hepatosplenomegaly, nontender, no peritoneal " signs  Skin: warm, perfused, no jaundice  Psych: Normal affect  MSK: Normal gait    PERTINENT STUDIES:  Most recent CBC:  Recent Labs   Lab Test 11/10/18  1000 09/15/18  0856   WBC 5.2 6.2   HGB 15.1 14.4   HCT 44.8 41.1    175     Most recent hepatic panel:  Recent Labs   Lab Test 11/10/18  1000 09/15/18  0856   ALT 36 46   AST 22 31     Most recent creatinine:  Recent Labs   Lab Test 07/01/14  1406 03/07/12  1152   CR 1.24 0.89     Again, thank you for allowing me to participate in the care of your patient.      Sincerely,    Fili Rutherford PA-C

## 2019-03-04 NOTE — NURSING NOTE
"Chief Complaint   Patient presents with     Gastrointestinal Problem     F/U for Ulcerative Colitis       Vitals:    03/04/19 0826   BP: 114/58   BP Location: Left arm   Patient Position: Sitting   Cuff Size: Adult Large   Pulse: 50   Resp: 16   Temp: 97.4  F (36.3  C)   TempSrc: Oral   SpO2: 100%   Weight: 111.2 kg (245 lb 3.2 oz)   Height: 2.032 m (6' 8\")       Body mass index is 26.94 kg/m .      Radha Paredes LPN                          "

## 2019-03-04 NOTE — PATIENT INSTRUCTIONS
It was a pleasure taking care of you today.  I've included a brief summary of our discussion and care plan from today's visit below.  Please review this information with your primary care provider.  ______________________________________________________________________    My recommendations are summarized as follows:    -- Start mesalamine as soon as possible   -- Patient with IBD we recommend supplementation vitamin D 1000 units daily and calcium 500 mg twice daily.  -- Vaccines/immunizations to be updated: Recommend yearly flu shot, pneumonia vaccines (Pneumovax 13 then 8 weeks later Pneumovax 23 then 5 years later Pneumovax 23), tetanus every 10 years.  -- Yearly Dermatology visit for skin check while on immunosuppressive therapy. Can call [phone] to schedule.  -- No NSAIDs (ibuprofen, or anything containing ibuprofen)     For additional resources about inflammatory bowel disease visit [email or web address]    Return to GI Clinic in 3 months to review your progress.    ______________________________________________________________________    Who do I call with any questions after my visit?  Please be in touch if there are any further questions that arise following today's visit.  There are multiple ways to contact your gastroenterology care team.        During business hours, you may reach a Gastroenterology nurse at [phone], option 3.       To schedule or reschedule an appointment, please call [phone].       You can always send a secure message through [employer].  [employer] messages are answered by your nurse or doctor typically within 24 hours.  Please allow extra time on weekends and holidays.        For urgent/emergent questions after business hours, you may reach the on-call GI Fellow by contacting the [facility] at [phone].      In order for your refill to be processed in a timely fashion, it is your responsibility to ensure you follow the  recommendations from your provider regarding your laboratory studies and follow up appointments.       How will I get the results of any tests ordered?    You will receive all of your results.  If you have signed up for [employer], any tests ordered at your visit will be available to you after your physician reviews them.  Typically this takes 1-2 weeks.  If there are urgent results that require a change in your care plan, your physician or nurse will call you to discuss the next steps.      What is [employer]?  M[employer] is a secure way for you to access all of your healthcare records from the [facility].  It is a web based computer program, so you can sign on to it from any location.  It also allows you to send secure messages to your care team.  I recommend signing up for [employer] access if you have not already done so and are comfortable with using a computer.      How to I schedule a follow-up visit?  If you did not schedule a follow-up visit today, please call [phone] to schedule a follow-up office visit.        Sincerely,    Fili Rutherford PA-C  [facility]  Division of Gastroenterology

## 2019-03-04 NOTE — PROGRESS NOTES
IBD CLINIC VISIT     CC/REFERRING MD:  Monica Pop   REASON FOR CONSULTATION: Ulcerative colitis    IBD HISTORY  Age at diagnosis: 19 (3/2010)  Extent of disease: Pancolitis (e4)  Current UC medications: none  Prior UC surgeries:  Prior IBD Medications:  - Sulfasalazine - worsening of symptoms  - Asacol - No symptomatic improvement   - 6MP - Severe flare requiring prednisone.   - Infliximab 5mg/kg, started summer 2012, developed antibodies    DRUG MONITORING  TPMT enzyme activity: Intermediate   IBD7: pANCA positive (3/22/2010)    6-TGN/6-MMPN levels:    Biologic concentration:  IFX level 11/10/18 = level not detected, antibodies >200    DISEASE ASSESSMENT  Labs:  Recent Labs   Lab Test 11/10/18  1000 09/15/18  0856   CRP <2.9 <2.9   SED 4 5     Endoscopic assessment: flex sig 2/2019 mild (devlin score 1), left sided UC. No bxs.  Enterography: --  Fecal calprotectin: 62.4 (12/2018)  C diff: --  pMayo score: 1/12    ASSESSMENT/PLAN  This is a 28-year-old male with presumed ulcerative pancolitis, with recent endoscopic assessment and histologic activity concerning for Crohn's and development of antibodies to infliximab:    1.  Presumed ulcerative pancolitis, with recent endoscopic assessment and histologic activity concerning for Crohn's: Unfortunately patient developed antibodies to infliximab and plan was to transition to 5ASA.  He was also evaluated for Enterpret trial, current does not meet criteria.  Did not resume lialda and is not on any maintenance therapy currently, but doing well.  Recent flex sig with mild left sided disease.  We discussed restarting mesalamine therapy.  Lialda quite expensive, so we are looking into alternative more affordable options. If we were to need to escalate therapy, would rescreen for enterpret trial or consider adalimumab would be a good option given he did respond to infliximab in the past.  -- Restart Lialda 4 pills per day (or affordable mesalamine alternative)  -- Will  require annual creatinine while on mesalamine therapy (obtain at next visit)  -- Can use fecal calprotectin to trend for following inflammation     2.  Possible hidradenitis suppurativa : Noted on outside hospital gastroenterology report.  Unclear if it was true HS, or old scarring.    -- Dermatology referral placed    3. IBD Health Care Maintenance:    Vaccinations:  All patients on biologics should avoid live vaccines.    -- Influenza (every year): 2018  -- TdaP (every 10 years): 1994  -- Pneumococcal Pneumonia (once plus booster at 5 years): recommend   -- Yearly assessment for latent Tb (verbal screening and exam, PPD or QuantiFERON-Tb testing):   PPD negative 8/11/2010, will need to obtain if restarting biologic    One time confirmation of immunity or serologies:  -- Hepatitis A (serologies or immunizations): not documented  -- Hepatitis B (serologies or immunizations):serologies indicate immunity  -- Varicella: had chicken pox as a child  -- MMR  -- HPV (all aged 18-26)  -- Meningococcal meningitis (all patients at risk for meningitis)-- Due to the immunosuppression in this patient, I would not advise administration of live vaccines such as varicella/VZV, intranasal influenza, MMR, or yellow fever vaccine (if travelling).      Bone mineral density screening   -- Recommend all patients supplement with calcium and vitamin D    Cancer Screening:  Colon cancer screening:  Given pancolitis, recommend patient undergo regular dysplasia surveillance.    Skin cancer screening: Annual visual exam of skin by dermatologist since patient is immunocompromised    Depression Screening:  -- Over the last month, have you felt down, depressed, or hopeless? No  -- Over the last month, have you felt little interest or pleasure doing things? No    Misc:  -- Avoid tobacco use  -- Avoid NSAIDs as there is potentially a 25% chance of causing an IBD flare    Return to clinic in 4-6 months    Thank you for this consultation.  It was a  pleasure to participate in the care of this patient; please contact us with any further questions.      This note was created with voice recognition software, and while reviewed for accuracy, typos may remain.     Fili Rutherford PA-C  Division of Gastroenterology, Hepatology and Nutrition  [facility]       HPI:   28-year-old male who presents today for follow-up.  Patient previously followed with pediatric IBD program then moved to [place] for a few years and return to the [place] this year and established care with Dr. Adkins.  Briefly patient was diagnosed in 2010 with ulcerative pancolitis.  He initially started on sulfasalazine and mesalamine with poor disease control.  He had frequent flares requiring prednisone.  He did well on 6-MP 25 mg a day (he was a heterozygote for TPMT enzyme activity), however he eventually flared and required prednisone.  He was then transitioned to infliximab in 2012. A few years ago, was started to get some symptoms at the end of his 8 week infusion, so went to every 6 weeks.  At some point, his dose was increased and he was placed back on every 8 week infusions. Despite clinically feeling well, a recently colonoscopy was performed 10/22/18 with intention of de-escalation of therpay, however there was evidence of ileitis (question of backwash ileitis) and mild continuous pancolitis. Biopsies show scant superficial small intestinal mucosa with patchy acute inflammation/cryptitis in the ileum.  Colonic and rectal biopsies consistent with mild chronic active colitis.  Biopsies of the cecum and ascending colon show rare noncaseating granulomas identified, negative for dysplasia. An infliximab level was obtained and showed 0 IFX level and high level of antibodies.    Due to a rash that developed on his forearms after his last infusion (which is when the level was drawn), a short prednisone taper was started. He completed his prednisone taper without recurrence of any  symptoms.    Currently he is having 2 stools daily without blood.  He has some urgency in the morning without accidents. No nighttime stools, tenesmus, abdominal pain, nausea, vomiting or fevers.  Weight is stable.    ROS:    No fevers or chills  No weight loss  No blurry vision, double vision or change in vision  No sore throat  No lymphadenopathy  No headache, paraesthesias, or weakness in a limb  No shortness of breath or wheezing  No chest pain or pressure  No arthralgias or myalgias  No rashes or skin changes  No odynophagia or dysphagia  No BRBPR, hematochezia, melena  No dysuria, frequency or urgency  No hot/cold intolerance or polyria  No anxiety or depression    Extra intestinal manifestations of IBD:  No uveitis/episcleritis  No aphthous ulcers   No arthritis   No erythema nodosum/pyoderma gangrenosum.     PERTINENT PAST MEDICAL HISTORY:  Past Medical History:   Diagnosis Date     Ulcerative colitis (H)        PREVIOUS SURGERIES:  Past Surgical History:   Procedure Laterality Date     COLONOSCOPY  7/10/2013    Procedure: COMBINED COLONOSCOPY, SINGLE BIOPSY/POLYPECTOMY BY BIOPSY;;  Surgeon: Julien Stewart MD;  Location:  OR     COLONOSCOPY N/A 2/15/2019    Procedure: COMBINED COLONOSCOPY, SINGLE OR MULTIPLE BIOPSY/POLYPECTOMY BY BIOPSY;  Surgeon: Tori Luque MD;  Location:  GI     ESOPHAGOSCOPY, GASTROSCOPY, DUODENOSCOPY (EGD), COMBINED  7/10/2013    Procedure: COMBINED ESOPHAGOSCOPY, GASTROSCOPY, DUODENOSCOPY (EGD), BIOPSY SINGLE OR MULTIPLE;  Colonoscopy, EGD, ulcerative colitis;  Surgeon: Julien Stewart MD;  Location:  OR       PREVIOUS ENDOSCOPY:  2013: Normal colon; path with mild to minimal inflammation.     colonoscopy 10/22/18 ileal inflammation, continuous mild pancolitis.  Biopsies show scant superficial small intestinal mucosa with patchy acute inflammation/cryptitis in the ileum.  Colonic and rectal biopsies consistent with mild chronic active colitis.  Biopsies of the cecum and  "ascending colon show rare noncaseating granulomas identified, negative for dysplasia.      ALLERGIES:   No Known Allergies    PERTINENT MEDICATIONS:    Current Outpatient Medications:      inFLIXimab (REMICADE) 100 MG injection, Inject 1,000 mg into the vein Every 8 weeks, Disp: , Rfl:      mesalamine (LIALDA) 1.2 g EC tablet, Take 4 tablets (4,800 mg) by mouth every morning (Patient not taking: Reported on 3/4/2019), Disp: 180 tablet, Rfl: 3     triamcinolone (KENALOG) 0.1 % external ointment, Apply topically 2 times daily (Patient not taking: Reported on 3/4/2019), Disp: 80 g, Rfl: 3    SOCIAL HISTORY:  [employer])  , no children  Never smoker    FAMILY HISTORY:  Family History   Problem Relation Age of Onset     Hypertension Father      Colon Cancer No family hx of      No CRC, no IBD  Past/family/social history reviewed and no changes    PHYSICAL EXAMINATION:  Constitutional: aaox3, cooperative, pleasant, not dyspneic/diaphoretic, no acute distress  Vitals reviewed: /58 (BP Location: Left arm, Patient Position: Sitting, Cuff Size: Adult Large)   Pulse 50   Temp 97.4  F (36.3  C) (Oral)   Resp 16   Ht 2.032 m (6' 8\")   Wt 111.2 kg (245 lb 3.2 oz)   SpO2 100%   BMI 26.94 kg/m    Wt:   Wt Readings from Last 2 Encounters:   03/04/19 111.2 kg (245 lb 3.2 oz)   12/03/18 113.4 kg (250 lb 1.6 oz)      Eyes: Sclera anicteric/injected  Ears/nose/mouth/throat: Normal oropharynx without ulcers or exudate, mucus membranes moist, hearing intact  Neck: supple, thyroid normal size  CV: No edema  Respiratory: Unlabored breathing  Lymph: No axillary, submandibular, supraclavicular or inguinal lymphadenopathy  Abd: Nondistended, +bs, no hepatosplenomegaly, nontender, no peritoneal signs  Skin: warm, perfused, no jaundice  Psych: Normal affect  MSK: Normal gait    PERTINENT STUDIES:  Most recent CBC:  Recent Labs   Lab Test 11/10/18  1000 09/15/18  0856   WBC 5.2 6.2   HGB " 15.1 14.4   HCT 44.8 41.1    175     Most recent hepatic panel:  Recent Labs   Lab Test 11/10/18  1000 09/15/18  0856   ALT 36 46   AST 22 31     Most recent creatinine:  Recent Labs   Lab Test 07/01/14  1406 03/07/12  1152   CR 1.24 0.89         Answers for HPI/ROS submitted by the patient on 3/4/2019   General Symptoms: No  Skin Symptoms: No  HENT Symptoms: No  EYE SYMPTOMS: No  HEART SYMPTOMS: No  LUNG SYMPTOMS: No  INTESTINAL SYMPTOMS: No  URINARY SYMPTOMS: No  REPRODUCTIVE SYMPTOMS: No  SKELETAL SYMPTOMS: No  BLOOD SYMPTOMS: No  NERVOUS SYSTEM SYMPTOMS: No  MENTAL HEALTH SYMPTOMS: No

## 2019-03-11 DIAGNOSIS — K51.00 ULCERATIVE PANCOLITIS WITHOUT COMPLICATION (H): Primary | ICD-10-CM

## 2019-03-11 NOTE — PROGRESS NOTES
Increased fatigue.    PLAN:  Labs to include CBC, LFTs, CRP, ESR, am cortisol, TSH, free T4 and vit D      ISIDRO XiongC  Division of Gastroenterology, Hepatology and Nutrition  [facility]

## 2019-03-13 DIAGNOSIS — K51.00 ULCERATIVE PANCOLITIS WITHOUT COMPLICATION (H): ICD-10-CM

## 2019-03-13 LAB
ALBUMIN SERPL-MCNC: 4 G/DL (ref 3.4–5)
ALP SERPL-CCNC: 66 U/L (ref 40–150)
ALT SERPL W P-5'-P-CCNC: 39 U/L (ref 0–70)
AST SERPL W P-5'-P-CCNC: 25 U/L (ref 0–45)
BASOPHILS # BLD AUTO: 0 10E9/L (ref 0–0.2)
BASOPHILS NFR BLD AUTO: 0.2 %
BILIRUB DIRECT SERPL-MCNC: 0.1 MG/DL (ref 0–0.2)
BILIRUB SERPL-MCNC: 0.5 MG/DL (ref 0.2–1.3)
CORTIS SERPL-MCNC: 10.6 UG/DL (ref 4–22)
CRP SERPL-MCNC: <2.9 MG/L (ref 0–8)
DIFFERENTIAL METHOD BLD: NORMAL
EOSINOPHIL # BLD AUTO: 0 10E9/L (ref 0–0.7)
EOSINOPHIL NFR BLD AUTO: 0.8 %
ERYTHROCYTE [DISTWIDTH] IN BLOOD BY AUTOMATED COUNT: 12.1 % (ref 10–15)
ERYTHROCYTE [SEDIMENTATION RATE] IN BLOOD BY WESTERGREN METHOD: 5 MM/H (ref 0–15)
HCT VFR BLD AUTO: 43.6 % (ref 40–53)
HGB BLD-MCNC: 15 G/DL (ref 13.3–17.7)
LYMPHOCYTES # BLD AUTO: 1.4 10E9/L (ref 0.8–5.3)
LYMPHOCYTES NFR BLD AUTO: 25.3 %
MCH RBC QN AUTO: 31.7 PG (ref 26.5–33)
MCHC RBC AUTO-ENTMCNC: 34.4 G/DL (ref 31.5–36.5)
MCV RBC AUTO: 92 FL (ref 78–100)
MONOCYTES # BLD AUTO: 0.5 10E9/L (ref 0–1.3)
MONOCYTES NFR BLD AUTO: 9.4 %
NEUTROPHILS # BLD AUTO: 3.4 10E9/L (ref 1.6–8.3)
NEUTROPHILS NFR BLD AUTO: 64.3 %
PLATELET # BLD AUTO: 195 10E9/L (ref 150–450)
PROT SERPL-MCNC: 7.8 G/DL (ref 6.8–8.8)
RBC # BLD AUTO: 4.73 10E12/L (ref 4.4–5.9)
TSH SERPL DL<=0.005 MIU/L-ACNC: 3.25 MU/L (ref 0.4–4)
WBC # BLD AUTO: 5.3 10E9/L (ref 4–11)

## 2019-03-13 PROCEDURE — 82306 VITAMIN D 25 HYDROXY: CPT | Performed by: PHYSICIAN ASSISTANT

## 2019-03-13 PROCEDURE — 82533 TOTAL CORTISOL: CPT | Performed by: PHYSICIAN ASSISTANT

## 2019-03-13 PROCEDURE — 80076 HEPATIC FUNCTION PANEL: CPT | Performed by: PHYSICIAN ASSISTANT

## 2019-03-13 PROCEDURE — 84443 ASSAY THYROID STIM HORMONE: CPT | Performed by: PHYSICIAN ASSISTANT

## 2019-03-13 PROCEDURE — 85025 COMPLETE CBC W/AUTO DIFF WBC: CPT | Performed by: PHYSICIAN ASSISTANT

## 2019-03-13 PROCEDURE — 36415 COLL VENOUS BLD VENIPUNCTURE: CPT | Performed by: PHYSICIAN ASSISTANT

## 2019-03-13 PROCEDURE — 85652 RBC SED RATE AUTOMATED: CPT | Performed by: PHYSICIAN ASSISTANT

## 2019-03-13 PROCEDURE — 86140 C-REACTIVE PROTEIN: CPT | Performed by: PHYSICIAN ASSISTANT

## 2019-03-14 LAB — DEPRECATED CALCIDIOL+CALCIFEROL SERPL-MC: 17 UG/L (ref 20–75)

## 2019-08-16 ASSESSMENT — ENCOUNTER SYMPTOMS
HEMATOCHEZIA: 0
PALPITATIONS: 0
ARTHRALGIAS: 0
SHORTNESS OF BREATH: 0
JOINT SWELLING: 0
CHILLS: 0
FREQUENCY: 0
FEVER: 0
WEAKNESS: 0
PARESTHESIAS: 0
ABDOMINAL PAIN: 0
HEARTBURN: 0
EYE PAIN: 0
COUGH: 0
DYSURIA: 0
DIARRHEA: 0
MYALGIAS: 0
HEADACHES: 0
SORE THROAT: 0
NERVOUS/ANXIOUS: 0
NAUSEA: 0
DIZZINESS: 0
CONSTIPATION: 0
HEMATURIA: 0

## 2019-08-19 ENCOUNTER — OFFICE VISIT (OUTPATIENT)
Dept: FAMILY MEDICINE | Facility: CLINIC | Age: 29
End: 2019-08-19
Payer: COMMERCIAL

## 2019-08-19 VITALS
WEIGHT: 254 LBS | BODY MASS INDEX: 27.9 KG/M2 | HEART RATE: 57 BPM | DIASTOLIC BLOOD PRESSURE: 62 MMHG | RESPIRATION RATE: 18 BRPM | TEMPERATURE: 97.5 F | OXYGEN SATURATION: 99 % | SYSTOLIC BLOOD PRESSURE: 138 MMHG

## 2019-08-19 DIAGNOSIS — Z87.19 HISTORY OF ULCERATIVE COLITIS: ICD-10-CM

## 2019-08-19 DIAGNOSIS — Z00.00 ROUTINE GENERAL MEDICAL EXAMINATION AT A HEALTH CARE FACILITY: Primary | ICD-10-CM

## 2019-08-19 LAB
ALBUMIN SERPL-MCNC: 3.9 G/DL (ref 3.4–5)
ALP SERPL-CCNC: 68 U/L (ref 40–150)
ALT SERPL W P-5'-P-CCNC: 35 U/L (ref 0–70)
ANION GAP SERPL CALCULATED.3IONS-SCNC: 5 MMOL/L (ref 3–14)
AST SERPL W P-5'-P-CCNC: 35 U/L (ref 0–45)
BASOPHILS # BLD AUTO: 0 10E9/L (ref 0–0.2)
BASOPHILS NFR BLD AUTO: 0.2 %
BILIRUB SERPL-MCNC: 0.8 MG/DL (ref 0.2–1.3)
BUN SERPL-MCNC: 14 MG/DL (ref 7–30)
CALCIUM SERPL-MCNC: 8.8 MG/DL (ref 8.5–10.1)
CHLORIDE SERPL-SCNC: 105 MMOL/L (ref 94–109)
CHOLEST SERPL-MCNC: 128 MG/DL
CO2 SERPL-SCNC: 28 MMOL/L (ref 20–32)
CREAT SERPL-MCNC: 1.05 MG/DL (ref 0.66–1.25)
DIFFERENTIAL METHOD BLD: NORMAL
EOSINOPHIL # BLD AUTO: 0 10E9/L (ref 0–0.7)
EOSINOPHIL NFR BLD AUTO: 0.7 %
ERYTHROCYTE [DISTWIDTH] IN BLOOD BY AUTOMATED COUNT: 12.2 % (ref 10–15)
GFR SERPL CREATININE-BSD FRML MDRD: >90 ML/MIN/{1.73_M2}
GLUCOSE SERPL-MCNC: 87 MG/DL (ref 70–99)
HBA1C MFR BLD: 4.8 % (ref 0–5.6)
HCT VFR BLD AUTO: 42.9 % (ref 40–53)
HDLC SERPL-MCNC: 44 MG/DL
HGB BLD-MCNC: 15 G/DL (ref 13.3–17.7)
LDLC SERPL CALC-MCNC: 75 MG/DL
LYMPHOCYTES # BLD AUTO: 1.5 10E9/L (ref 0.8–5.3)
LYMPHOCYTES NFR BLD AUTO: 24.7 %
MCH RBC QN AUTO: 31.6 PG (ref 26.5–33)
MCHC RBC AUTO-ENTMCNC: 35 G/DL (ref 31.5–36.5)
MCV RBC AUTO: 90 FL (ref 78–100)
MONOCYTES # BLD AUTO: 0.6 10E9/L (ref 0–1.3)
MONOCYTES NFR BLD AUTO: 10 %
NEUTROPHILS # BLD AUTO: 3.9 10E9/L (ref 1.6–8.3)
NEUTROPHILS NFR BLD AUTO: 64.4 %
NONHDLC SERPL-MCNC: 84 MG/DL
PLATELET # BLD AUTO: 188 10E9/L (ref 150–450)
POTASSIUM SERPL-SCNC: 4.1 MMOL/L (ref 3.4–5.3)
PROT SERPL-MCNC: 8.1 G/DL (ref 6.8–8.8)
RBC # BLD AUTO: 4.75 10E12/L (ref 4.4–5.9)
SODIUM SERPL-SCNC: 138 MMOL/L (ref 133–144)
TRIGL SERPL-MCNC: 43 MG/DL
WBC # BLD AUTO: 6 10E9/L (ref 4–11)

## 2019-08-19 PROCEDURE — 99395 PREV VISIT EST AGE 18-39: CPT | Mod: 25 | Performed by: PHYSICIAN ASSISTANT

## 2019-08-19 PROCEDURE — 80053 COMPREHEN METABOLIC PANEL: CPT | Performed by: PHYSICIAN ASSISTANT

## 2019-08-19 PROCEDURE — 83036 HEMOGLOBIN GLYCOSYLATED A1C: CPT | Performed by: PHYSICIAN ASSISTANT

## 2019-08-19 PROCEDURE — 36415 COLL VENOUS BLD VENIPUNCTURE: CPT | Performed by: PHYSICIAN ASSISTANT

## 2019-08-19 PROCEDURE — 90471 IMMUNIZATION ADMIN: CPT | Performed by: PHYSICIAN ASSISTANT

## 2019-08-19 PROCEDURE — 90715 TDAP VACCINE 7 YRS/> IM: CPT | Performed by: PHYSICIAN ASSISTANT

## 2019-08-19 PROCEDURE — 85025 COMPLETE CBC W/AUTO DIFF WBC: CPT | Performed by: PHYSICIAN ASSISTANT

## 2019-08-19 PROCEDURE — 80061 LIPID PANEL: CPT | Performed by: PHYSICIAN ASSISTANT

## 2019-08-19 ASSESSMENT — ENCOUNTER SYMPTOMS
DIARRHEA: 0
HEADACHES: 0
JOINT SWELLING: 0
CONSTIPATION: 0
HEMATURIA: 0
FEVER: 0
ABDOMINAL PAIN: 0
COUGH: 0
MYALGIAS: 0
SHORTNESS OF BREATH: 0
DIZZINESS: 0
HEMATOCHEZIA: 0
ARTHRALGIAS: 0
DYSURIA: 0
NAUSEA: 0
PALPITATIONS: 0
HEARTBURN: 0
FREQUENCY: 0
EYE PAIN: 0
PARESTHESIAS: 0
CHILLS: 0
NERVOUS/ANXIOUS: 0
SORE THROAT: 0
WEAKNESS: 0

## 2019-08-19 NOTE — NURSING NOTE
Screening Questionnaire for Adult Immunization    Are you sick today?   No   Do you have allergies to medications, food, a vaccine component or latex?   No   Have you ever had a serious reaction after receiving a vaccination?   No   Do you have a long-term health problem with heart disease, lung disease, asthma, kidney disease, metabolic disease (e.g. diabetes), anemia, or other blood disorder?   No   Do you have cancer, leukemia, HIV/AIDS, or any other immune system problem?   No   In the past 3 months, have you taken medications that affect  your immune system, such as prednisone, other steroids, or anticancer drugs; drugs for the treatment of rheumatoid arthritis, Crohn s disease, or psoriasis; or have you had radiation treatments?   No   Have you had a seizure, or a brain or other nervous system problem?   No   During the past year, have you received a transfusion of blood or blood     products, or been given immune (gamma) globulin or antiviral drug?   No   For women: Are you pregnant or is there a chance you could become        pregnant during the next month?   No   Have you received any vaccinations in the past 4 weeks?   No     Immunization questionnaire answers were all negative.        Per orders of Martin Lee, injection of TDAP given by Miguel Guardado. Patient instructed to remain in clinic for 15 minutes afterwards, and to report any adverse reaction to me immediately.       Questionnaire completed by patient.    Miguel Guardado MA

## 2019-08-19 NOTE — PROGRESS NOTES
SUBJECTIVE:   CC: Noam Eugene is an 28 year old male who presents for preventative health visit.     Healthy Habits:     Getting at least 3 servings of Calcium per day:  NO    Bi-annual eye exam:  NO    Dental care twice a year:  NO    Sleep apnea or symptoms of sleep apnea:  None    Diet:  Regular (no restrictions)    Frequency of exercise:  4-5 days/week    Duration of exercise:  45-60 minutes    Taking medications regularly:  Yes    Medication side effects:  None    PHQ-2 Total Score: 2    Additional concerns today:  No    Patient presents for routine health physical. Went to [facility] for school, played basketball. 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 In the future would like to transition to work in sports in some capacity. Has history of UC on remicade for 4-5 years then the medication stopped working in November 2018. Patient reports January 2019 UC symptoms improved and is currently off all medications. Currently lives nearby with wife who he met in college. They were  last year 7/7/18. Originally from [place], [place]. No health concerns today, feeling well.     PMH:   Ulcerative colitis     Family History:  DM on dad's side of family   Hypertension on mom's side of family    Social:  Lives with wife  Social alcohol   Plays pickup basketball  Plays golf       Today's PHQ-2 Score:   PHQ-2 ( 1999 Pfizer) 8/16/2019   Q1: Little interest or pleasure in doing things 1   Q2: Feeling down, depressed or hopeless 1   PHQ-2 Score 2   Q1: Little interest or pleasure in doing things Several days   Q2: Feeling down, depressed or hopeless Several days   PHQ-2 Score 2     Abuse: Current or Past(Physical, Sexual or Emotional)- No  Do you feel safe in your environment? Yes    Social History     Tobacco Use     Smoking status: Never Smoker     Smokeless tobacco: Never Used   Substance Use Topics     Alcohol use: Yes     Frequency: Monthly or less     Comment:  occasional      Alcohol Use 8/16/2019   Prescreen: >3 drinks/day or >7 drinks/week? No     Last PSA: No results found for: PSA    Reviewed orders with patient. Reviewed health maintenance and updated orders accordingly - Yes  BP Readings from Last 3 Encounters:   08/19/19 138/62   03/04/19 114/58   02/15/19 135/75    Wt Readings from Last 3 Encounters:   08/19/19 115.2 kg (254 lb)   03/04/19 111.2 kg (245 lb 3.2 oz)   12/03/18 113.4 kg (250 lb 1.6 oz)                  Patient Active Problem List   Diagnosis     Ulcerative colitis (H)     Skin growth     Near syncope     Other ulcerative colitis without complication (H)     Past Surgical History:   Procedure Laterality Date     COLONOSCOPY  7/10/2013    Procedure: COMBINED COLONOSCOPY, SINGLE BIOPSY/POLYPECTOMY BY BIOPSY;;  Surgeon: Julien Stewart MD;  Location:  OR     COLONOSCOPY N/A 2/15/2019    Procedure: COMBINED COLONOSCOPY, SINGLE OR MULTIPLE BIOPSY/POLYPECTOMY BY BIOPSY;  Surgeon: Tori Luque MD;  Location:  GI     ESOPHAGOSCOPY, GASTROSCOPY, DUODENOSCOPY (EGD), COMBINED  7/10/2013    Procedure: COMBINED ESOPHAGOSCOPY, GASTROSCOPY, DUODENOSCOPY (EGD), BIOPSY SINGLE OR MULTIPLE;  Colonoscopy, EGD, ulcerative colitis;  Surgeon: Julien Stewart MD;  Location:  OR       Social History     Tobacco Use     Smoking status: Never Smoker     Smokeless tobacco: Never Used   Substance Use Topics     Alcohol use: Yes     Frequency: Monthly or less     Comment: occasional      Family History   Problem Relation Age of Onset     Hypertension Father      Colon Cancer No family hx of          No current outpatient medications on file.       Reviewed and updated as needed this visit by clinical staff  Tobacco  Allergies  Meds  Med Hx  Surg Hx  Fam Hx  Soc Hx        Reviewed and updated as needed this visit by Provider          Review of Systems   Constitutional: Negative for chills and fever.   HENT: Negative for congestion, ear pain, hearing loss and sore  throat.    Eyes: Negative for pain and visual disturbance.   Respiratory: Negative for cough and shortness of breath.    Cardiovascular: Negative for chest pain, palpitations and peripheral edema.   Gastrointestinal: Negative for abdominal pain, constipation, diarrhea, heartburn, hematochezia and nausea.   Genitourinary: Negative for discharge, dysuria, frequency, genital sores, hematuria, impotence and urgency.   Musculoskeletal: Negative for arthralgias, joint swelling and myalgias.   Skin: Negative for rash.   Neurological: Negative for dizziness, weakness, headaches and paresthesias.   Psychiatric/Behavioral: Negative for mood changes. The patient is not nervous/anxious.          OBJECTIVE:   /62 (BP Location: Right arm, Patient Position: Sitting, Cuff Size: Adult Regular)   Pulse 57   Temp 97.5  F (36.4  C) (Oral)   Resp 18   Wt 115.2 kg (254 lb)   SpO2 99%   BMI 27.90 kg/m    Physical Exam  GENERAL: healthy, alert and no distress  EYES: Eyes grossly normal to inspection, PERRL and conjunctivae and sclerae normal  HENT: ear canals and TM's normal, nose and mouth without ulcers or lesions  NECK: no adenopathy, no asymmetry, masses, or scars and thyroid normal to palpation  RESP: lungs clear to auscultation - no rales, rhonchi or wheezes  CV: regular rate and rhythm, normal S1 S2, no S3 or S4, no murmur, click or rub, no peripheral edema and peripheral pulses strong  ABDOMEN: soft, nontender, no hepatosplenomegaly, no masses and bowel sounds normal   (male): normal male genitalia without lesions or urethral discharge, no hernia  MS: no gross musculoskeletal defects noted, no edema  SKIN: no suspicious lesions or rashes  NEURO: Normal strength and tone, mentation intact and speech normal  PSYCH: mentation appears normal, affect normal/bright    Diagnostic Test Results:  Labs reviewed in Epic  Results for orders placed or performed in visit on 08/19/19 (from the past 24 hour(s))   Hemoglobin A1c  "  Result Value Ref Range    Hemoglobin A1C 4.8 0 - 5.6 %   CBC with platelets and differential   Result Value Ref Range    WBC 6.0 4.0 - 11.0 10e9/L    RBC Count 4.75 4.4 - 5.9 10e12/L    Hemoglobin 15.0 13.3 - 17.7 g/dL    Hematocrit 42.9 40.0 - 53.0 %    MCV 90 78 - 100 fl    MCH 31.6 26.5 - 33.0 pg    MCHC 35.0 31.5 - 36.5 g/dL    RDW 12.2 10.0 - 15.0 %    Platelet Count 188 150 - 450 10e9/L    % Neutrophils 64.4 %    % Lymphocytes 24.7 %    % Monocytes 10.0 %    % Eosinophils 0.7 %    % Basophils 0.2 %    Absolute Neutrophil 3.9 1.6 - 8.3 10e9/L    Absolute Lymphocytes 1.5 0.8 - 5.3 10e9/L    Absolute Monocytes 0.6 0.0 - 1.3 10e9/L    Absolute Eosinophils 0.0 0.0 - 0.7 10e9/L    Absolute Basophils 0.0 0.0 - 0.2 10e9/L    Diff Method Automated Method        ASSESSMENT/PLAN:   1. Routine general medical examination at a health care facility  - TDAP VACCINE (ADACEL)  - ADMIN VACCINE, ADDL  - Lipid panel reflex to direct LDL Non-fasting  - Hemoglobin A1c  - Comprehensive metabolic panel (BMP + Alb, Alk Phos, ALT, AST, Total. Bili, TP)  - CBC with platelets and differential    2. History of ulcerative colitis   - Patient not currently using medications  - Asymptomatic at present  - Has followed up with GI in the past but not currently    COUNSELING:   Reviewed preventive health counseling, as reflected in patient instructions  Special attention given to:        Regular exercise       Healthy diet/nutrition       Vision screening       Immunizations    Vaccinated for: TDAP        Estimated body mass index is 27.9 kg/m  as calculated from the following:    Height as of 3/4/19: 2.032 m (6' 8\").    Weight as of this encounter: 115.2 kg (254 lb).     Weight management plan: elevated body mass due to increased muscle mass, patient obtaining sufficient exercise on daily basis     reports that he has never smoked. He has never used smokeless tobacco.      Counseling Resources:  ATP IV Guidelines  Pooled Cohorts Equation " Calculator  FRAX Risk Assessment  ICSI Preventive Guidelines  Dietary Guidelines for Americans, 2010  USDA's MyPlate  ASA Prophylaxis  Lung CA Screening    Martin Lee PA-C  [facility]

## 2019-09-30 ENCOUNTER — HEALTH MAINTENANCE LETTER (OUTPATIENT)
Age: 29
End: 2019-09-30

## 2020-08-25 ENCOUNTER — VIRTUAL VISIT (OUTPATIENT)
Dept: FAMILY MEDICINE | Facility: OTHER | Age: 30
End: 2020-08-25

## 2020-08-25 DIAGNOSIS — Z20.822 SUSPECTED 2019 NOVEL CORONAVIRUS INFECTION: Primary | ICD-10-CM

## 2020-08-25 NOTE — PROGRESS NOTES
"Date: 2020 11:34:22  Clinician: Ric Rodríguez  Clinician NPI: [id number]  Patient: Manny Eugene  Patient : 1990  Patient Address: [address] [place] [address] [address], [place] [address]  Patient Phone: [phone]  Visit Protocol: URI  Patient Summary:  Manny is a 29 year old ( : 1990 ) male who initiated a Visit for COVID-19 (Coronavirus) evaluation and screening. When asked the question \"Please sign me up to receive news, health information and promotions. \", Manny responded \"No\".    Manny states his symptoms started 1-2 days ago.   His symptoms consist of a headache, chills, malaise, a sore throat, and myalgia. Manny also feels feverish.   Symptom details     Sore throat: Manny reports having mild throat pain (1-3 on a 10 point pain scale), does not have exudate on his tonsils, and can swallow liquids. The lymph nodes in his neck are not enlarged. A rash has not appeared on the skin since the sore throat started.     Temperature: His current temperature is 99 degrees Fahrenheit.     Headache: He states the headache is mild (1-3 on a 10 point pain scale).      Manny denies having ear pain, enlarged lymph nodes, wheezing, teeth pain, ageusia, diarrhea, cough, nasal congestion, vomiting, rhinitis, nausea, anosmia, and facial pain or pressure. He also denies having a sinus infection within the past year, having recent facial or sinus surgery in the past 60 days, and taking antibiotic medication in the past month. He is not experiencing dyspnea.   Precipitating events  Within the past week, Manny has not been exposed to someone with strep throat. He has not recently been exposed to someone with influenza. Manny has not been in close contact with any high risk individuals.   Pertinent COVID-19 (Coronavirus) information  In the past 14 days, Manny has not worked in a congregate living setting.   He does not work or volunteer as healthcare worker or a  and does not work or volunteer in a healthcare " facility.   Manny also has not lived in a congregate living setting in the past 14 days. He does not live with a healthcare worker.   Manny has not had a close contact with a laboratory-confirmed COVID-19 patient within 14 days of symptom onset.   Since December 2019, Manny and has not had upper respiratory infection or influenza-like illness. Has not been diagnosed with lab-confirmed COVID-19 test   Pertinent medical history  Manny does not need a return to work/school note.   Weight: 254 lbs   Manny does not smoke or use smokeless tobacco.   Weight: 254 lbs    MEDICATIONS: No current medications, ALLERGIES: NKDA  Clinician Response:  Dear Manny,   Your symptoms show that you may have coronavirus (COVID-19). This illness can cause fever, cough and trouble breathing. Many people get a mild case and get better on their own. Some people can get very sick.  What should I do?  We would like to test you for this virus.   1. Please call [phone] to schedule your visit. Explain that you were referred by [facility] to have a COVID-19 test. Be ready to share your [facility] visit ID number.  The following will serve as your written order for this COVID Test, ordered by me, for the indication of suspected COVID [Z20.828]: The test will be ordered in [employer], our electronic health record, after you are scheduled. It will show as ordered and authorized by Anil Browne MD.  Order: COVID-19 (Coronavirus) PCR for SYMPTOMATIC testing from [facility].      2. When it's time for your COVID test:  Stay at least 6 feet away from others. (If someone will drive you to your test, stay in the backseat, as far away from the  as you can.)   Cover your mouth and nose with a mask, tissue or washcloth.  Go straight to the testing site. Don't make any stops on the way there or back.      3.Starting now: Stay home and away from others (self-isolate) until:   You've had no fever---and no medicine that reduces fever---for one full day (24 hours). And...   Your  "other symptoms have gotten better. For example, your cough or breathing has improved. And...   At least 10 days have passed since your symptoms started.       During this time, don't leave the house except for testing or medical care.   Stay in your own room, even for meals. Use your own bathroom if you can.   Stay away from others in your home. No hugging, kissing or shaking hands. No visitors.  Don't go to work, school or anywhere else.    Clean \"high touch\" surfaces often (doorknobs, counters, handles, etc.). Use a household cleaning spray or wipes. You'll find a full list of  on the EPA website: www.epa.gov/pesticide-registration/list-n-disinfectants-use-against-sars-cov-2.   Cover your mouth and nose with a mask, tissue or washcloth to avoid spreading germs.  Wash your hands and face often. Use soap and water.  Caregivers in these groups are at risk for severe illness due to COVID-19:  o People 65 years and older  o People who live in a nursing home or long-term care facility  o People with chronic disease (lung, heart, cancer, diabetes, kidney, liver, immunologic)  o People who have a weakened immune system, including those who:   Are in cancer treatment  Take medicine that weakens the immune system, such as corticosteroids  Had a bone marrow or organ transplant  Have an immune deficiency  Have poorly controlled HIV or AIDS  Are obese (body mass index of 40 or higher)  Smoke regularly   o Caregivers should wear gloves while washing dishes, handling laundry and cleaning bedrooms and bathrooms.  o Use caution when washing and drying laundry: Don't shake dirty laundry, and use the warmest water setting that you can.  o For more tips, go to www.cdc.gov/coronavirus/2019-ncov/downloads/10Things.pdf.    4.Sign up for [employer]. We know it's scary to hear that you might have COVID-19. We want to track your symptoms to make sure you're okay over the next 2 weeks. Please look for an email from [employer]---this is a free, online program that we'll use to keep in touch. To sign up, follow the link in the email. Learn more at [email or web address]  How can I take care of myself?   Get lots of rest. Drink extra fluids (unless a doctor has told you not to).   Take Tylenol (acetaminophen) for fever or pain. If you have liver or kidney problems, ask your family doctor if it's okay to take Tylenol.   Adults can take either:    650 mg (two 325 mg pills) every 4 to 6 hours, or...   1,000 mg (two 500 mg pills) every 8 hours as needed.    Note: Don't take more than 3,000 mg in one day. Acetaminophen is found in many medicines (both prescribed and over-the-counter medicines). Read all labels to be sure you don't take too much.   For children, check the Tylenol bottle for the right dose. The dose is based on the child's age or weight.    If you have other health problems (like cancer, heart failure, an organ transplant or severe kidney disease): Call your specialty clinic if you don't feel better in the next 2 days.       Know when to call 911. Emergency warning signs include:    Trouble breathing or shortness of breath Pain or pressure in the chest that doesn't go away Feeling confused like you haven't felt before, or not being able to wake up Bluish-colored lips or face.  Where can I get more information?   [facility] -- About COVID-19: [email or web address]   CDC -- What to Do If You're Sick: www.cdc.gov/coronavirus/2019-ncov/about/steps-when-sick.html   CDC -- Ending Home Isolation: www.cdc.gov/coronavirus/2019-ncov/hcp/disposition-in-home-patients.html   CDC -- Caring for Someone: www.cdc.gov/coronavirus/2019-ncov/if-you-are-sick/care-for-someone.html   [facility] -- Interim Guidance for Hospital Discharge to Home: www.health.[facility].[place]/diseases/coronavirus/hcp/hospdischarge.pdf   [facility] clinical trials (COVID-19 research studies): clinicalaffairs.[facility]-clinical-trials     Below are the COVID-19 hotlines at the [place] Department of Health ([facility]). Interpreters are available.    For health questions: Call 168-044-5743 or 1-787.688.4660 (7 a.m. to 7 p.m.) For questions about schools and childcare: Call 057-001-7097 or 1-106.273.4162 (7 a.m. to 7 p.m.)    Diagnosis: Other malaise  Diagnosis ICD: R53.81

## 2020-08-27 DIAGNOSIS — Z20.822 SUSPECTED 2019 NOVEL CORONAVIRUS INFECTION: ICD-10-CM

## 2020-08-27 PROCEDURE — U0003 INFECTIOUS AGENT DETECTION BY NUCLEIC ACID (DNA OR RNA); SEVERE ACUTE RESPIRATORY SYNDROME CORONAVIRUS 2 (SARS-COV-2) (CORONAVIRUS DISEASE [COVID-19]), AMPLIFIED PROBE TECHNIQUE, MAKING USE OF HIGH THROUGHPUT TECHNOLOGIES AS DESCRIBED BY CMS-2020-01-R: HCPCS | Performed by: FAMILY MEDICINE

## 2020-08-28 ENCOUNTER — TELEPHONE (OUTPATIENT)
Dept: EMERGENCY MEDICINE | Facility: CLINIC | Age: 30
End: 2020-08-28

## 2020-08-28 LAB
SARS-COV-2 RNA SPEC QL NAA+PROBE: ABNORMAL
SPECIMEN SOURCE: ABNORMAL

## 2020-08-28 NOTE — TELEPHONE ENCOUNTER
Coronavirus (COVID-19) Notification    Reason for call  Notify of POSITIVE  COVID-19 lab result, assess symptoms,  review [facility] recommendations    Lab Result   Lab test for 2019-nCoV rRt-PCR or SARS-COV-2 PCR  Oropharyngeal AND/OR nasopharyngeal swabs were POSITIVE for 2019-nCoV RNA [OR] SARS-COV-2 RNA (COVID-19) RNA     We have been unable to reach Patient by phone at this time to notify of their Positive COVID-19 result.  Left voicemail message requesting a call back to [phone] [facility] for results.        POSITIVE COVID-19 Letter sent.    [Name]  Anant Cannon RN  [facility]  COVID19 Results Team RN  Ph# [phone]

## 2020-08-28 NOTE — TELEPHONE ENCOUNTER
"Pt is calling.    Results given and protocol and care advice reviewed with him in detail.   I advised him to call back with any further questions or concerns.  He verbalized understanding.    Coronavirus (COVID-19) Notification    Pt, himself is calling.    Reason for call  Notify of Positive Coronavirus (COVID-19) lab results, assess symptoms,  review [facility] recommendations    Lab Result    Lab test:  2019-nCoV rRt-PCR or SARS-CoV-2 PCR    Oropharyngeal AND/OR nasopharyngeal swabs is POSITIVE for 2019-nCoV RNA/SARS-COV-2 PCR (COVID-19 virus)    RN Recommendations/Instructions per [facility] Coronavirus COVID-19 recommendations    Brief introduction script  Introduce self then review script:  \"I am calling on behalf of [employer].  We were notified that your Coronavirus test (COVID-19) for was POSITIVE for the virus.  I have some information to relay to you but first I wanted to mention that the [place] Dept of Health will be contacting you shortly [it's possible MD already called Patient] to talk to you more about how you are feeling and other people you have had contact with who might now also have the virus.  Also, [facility] is Partnering with the [facility] for Covid-19 research, you may be contacted directly by research staff.\"    Assessment (Inquire about Patient's current symptoms)   Assessment   Current Symptoms at time of phone call: (if no symptoms, document No symptoms] Nasal congestion, runny nose, sore throat.   Symptoms onset (if applicable) Nasal congestion, runny nose, sore throat, HA     If at time of call, Patients symptoms hare worsened, the Patient should contact 911 or have someone drive them to Emergency Dept promptly:      If Patient calling 911, inform 911 personal that you have tested positive for the Coronavirus (COVID-19).  Place mask on and await 911 to arrive.    If Emergency Dept, If possible, please have another adult drive you to the Emergency Dept " but you need to wear mask when in contact with other people.      Review information with Patient    Your result was positive. This means you have COVID-19 (coronavirus).  We have sent you a letter that reviews the information that I'll be reviewing with you now.    How can I protect others?    If you have symptoms: stay home and away from others (self-isolate) until:    You've had no fever--and no medicine that reduces fever--for 1 full day (24 hours). And       Your other symptoms have gotten better. For example, your cough or breathing has improved. And     At least 10 days have passed since your symptoms started. (If you've been told by a doctor that you have a weak immune system, wait 20 days.)     If you don't have symptoms: Stay home and away from others (self-isolate) until at least 10 days have passed since your first positive COVID-19 test. (Date test collected)    During this time:    Stay in your own room, including for meals. Use your own bathroom if you can.    Stay away from others in your home. No hugging, kissing or shaking hands. No visitors.     Don't go to work, school or anywhere else.     Clean  high touch  surfaces often (doorknobs, counters, handles, etc.). Use a household cleaning spray or wipes. You'll find a full list on the EPA website at www.epa.gov/pesticide-registration/list-n-disinfectants-use-against-sars-cov-2.     Cover your mouth and nose with a mask, tissue or other face covering to avoid spreading germs.    Wash your hands and face often with soap and water.    Caregivers in these groups are at risk for severe illness due to COVID-19:  o People 65 years and older  o People who live in a nursing home or long-term care facility  o People with chronic disease (lung, heart, cancer, diabetes, kidney, liver, immunologic)  o People who have a weakened immune system, including those who:  - Are in cancer treatment  - Take medicine that weakens the immune system, such as  corticosteroids  - Had a bone marrow or organ transplant  - Have an immune deficiency  - Have poorly controlled HIV or AIDS  - Are obese (body mass index of 40 or higher)  - Smoke regularly    Caregivers should wear gloves while washing dishes, handling laundry and cleaning bedrooms and bathrooms.    Wash and dry laundry with special caution. Don't shake dirty laundry, and use the warmest water setting you can.    If you have a weakened immune system, ask your doctor about other actions you should take.    For more tips, go to www.cdc.gov/coronavirus/2019-ncov/downloads/10Things.pdf.    You should not go back to work until you meet the guidelines above for ending your home isolation. You should meet these along with any other guidelines that your employer has.    Employers: This document serves as formal notice of your employee's medical guidelines for going back to work. They must meet the above guidelines before going back to work in person.    How can I take care of myself?    1. Get lots of rest. Drink extra fluids (unless a doctor has told you not to).    2. Take Tylenol (acetaminophen) for fever or pain. If you have liver or kidney problems, ask your family doctor if it's okay to take Tylenol.     Take either:     650 mg (two 325 mg pills) every 4 to 6 hours, or     1,000 mg (two 500 mg pills) every 8 hours as needed.     Note: Don't take more than 3,000 mg in one day. Acetaminophen is found in many medicines (both prescribed and over-the-counter medicines). Read all labels to be sure you don't take too much.    For children, check the Tylenol bottle for the right dose (based on their age or weight).    3. If you have other health problems (like cancer, heart failure, an organ transplant or severe kidney disease): Call your specialty clinic if you don't feel better in the next 2 days.    4. Know when to call 911: Emergency warning signs include:    Trouble breathing or shortness of breath    Pain or pressure  in the chest that doesn't go away    Feeling confused like you haven't felt before, or not being able to wake up    Bluish-colored lips or face    5. Sign up for [employer]. We know it's scary to hear that you have COVID-19. We want to track your symptoms to make sure you're okay over the next 2 weeks. Please look for an email from [employer]--this is a free, online program that we'll use to keep in touch. To sign up, follow the link in the email. Learn more at [email or web address].    Where can I get more information?    [facility]: [email or web address]    Coronavirus Basics: www.health.[facility].[place]./diseases/coronavirus/basics.html    What to Do If You're Sick: www.cdc.gov/coronavirus/2019-ncov/about/steps-when-sick.html    Ending Home Isolation: www.cdc.gov/coronavirus/2019-ncov/hcp/disposition-in-home-patients.html     Caring for Someone with COVID-19: www.cdc.gov/coronavirus/2019-ncov/if-you-are-sick/care-for-someone.html     [facility] clinical trials (COVID-19 research studies): clinicalaffairs.[facility]/n-clinical-trials     A Positive COVID-19 letter will be sent via [employer] or the mail. (Exception, no letters sent to Presurgerical/Preprocedure Patients)    Eunice Garcia RN  [facility] Triage Nurse Advisor  8/28/2020 at 4:05 PM

## 2021-01-15 ENCOUNTER — HEALTH MAINTENANCE LETTER (OUTPATIENT)
Age: 31
End: 2021-01-15

## 2021-10-24 ENCOUNTER — HEALTH MAINTENANCE LETTER (OUTPATIENT)
Age: 31
End: 2021-10-24

## 2022-02-13 ENCOUNTER — HEALTH MAINTENANCE LETTER (OUTPATIENT)
Age: 32
End: 2022-02-13

## 2022-10-15 ENCOUNTER — HEALTH MAINTENANCE LETTER (OUTPATIENT)
Age: 32
End: 2022-10-15

## 2023-03-26 ENCOUNTER — HEALTH MAINTENANCE LETTER (OUTPATIENT)
Age: 33
End: 2023-03-26

## 2023-05-06 ENCOUNTER — HOSPITAL ENCOUNTER (EMERGENCY)
Facility: CLINIC | Age: 33
Discharge: HOME | End: 2023-05-06
Attending: PHYSICIAN ASSISTANT | Admitting: PHYSICIAN ASSISTANT
Payer: COMMERCIAL

## 2023-05-06 ENCOUNTER — APPOINTMENT (OUTPATIENT)
Dept: CT IMAGING | Facility: CLINIC | Age: 33
End: 2023-05-06
Attending: PHYSICIAN ASSISTANT
Payer: COMMERCIAL

## 2023-05-06 VITALS
TEMPERATURE: 98 F | HEIGHT: 78 IN | HEART RATE: 94 BPM | WEIGHT: 235 LBS | DIASTOLIC BLOOD PRESSURE: 62 MMHG | RESPIRATION RATE: 20 BRPM | OXYGEN SATURATION: 97 % | BODY MASS INDEX: 27.19 KG/M2 | SYSTOLIC BLOOD PRESSURE: 135 MMHG

## 2023-05-06 DIAGNOSIS — K61.0 PERIANAL ABSCESS: ICD-10-CM

## 2023-05-06 LAB
ANION GAP SERPL CALCULATED.3IONS-SCNC: 11 MMOL/L (ref 7–15)
BASOPHILS # BLD AUTO: 0 10E3/UL (ref 0–0.2)
BASOPHILS NFR BLD AUTO: 0 %
BUN SERPL-MCNC: 10.1 MG/DL (ref 6–20)
CALCIUM SERPL-MCNC: 9.4 MG/DL (ref 8.6–10)
CHLORIDE SERPL-SCNC: 97 MMOL/L (ref 98–107)
CREAT SERPL-MCNC: 1.08 MG/DL (ref 0.67–1.17)
DEPRECATED HCO3 PLAS-SCNC: 28 MMOL/L (ref 22–29)
EOSINOPHIL # BLD AUTO: 0 10E3/UL (ref 0–0.7)
EOSINOPHIL NFR BLD AUTO: 0 %
ERYTHROCYTE [DISTWIDTH] IN BLOOD BY AUTOMATED COUNT: 12.2 % (ref 10–15)
GFR SERPL CREATININE-BSD FRML MDRD: >90 ML/MIN/1.73M2
GLUCOSE SERPL-MCNC: 105 MG/DL (ref 70–99)
HCT VFR BLD AUTO: 43.6 % (ref 40–53)
HGB BLD-MCNC: 15 G/DL (ref 13.3–17.7)
IMM GRANULOCYTES # BLD: 0.1 10E3/UL
IMM GRANULOCYTES NFR BLD: 0 %
LYMPHOCYTES # BLD AUTO: 0.9 10E3/UL (ref 0.8–5.3)
LYMPHOCYTES NFR BLD AUTO: 6 %
MCH RBC QN AUTO: 31.7 PG (ref 26.5–33)
MCHC RBC AUTO-ENTMCNC: 34.4 G/DL (ref 31.5–36.5)
MCV RBC AUTO: 92 FL (ref 78–100)
MONOCYTES # BLD AUTO: 1.2 10E3/UL (ref 0–1.3)
MONOCYTES NFR BLD AUTO: 8 %
NEUTROPHILS # BLD AUTO: 13 10E3/UL (ref 1.6–8.3)
NEUTROPHILS NFR BLD AUTO: 86 %
NRBC # BLD AUTO: 0 10E3/UL
NRBC BLD AUTO-RTO: 0 /100
PLATELET # BLD AUTO: 171 10E3/UL (ref 150–450)
POTASSIUM SERPL-SCNC: 4 MMOL/L (ref 3.4–5.3)
RBC # BLD AUTO: 4.73 10E6/UL (ref 4.4–5.9)
SODIUM SERPL-SCNC: 136 MMOL/L (ref 136–145)
WBC # BLD AUTO: 15.2 10E3/UL (ref 4–11)

## 2023-05-06 PROCEDURE — 250N000011 HC RX IP 250 OP 636: Performed by: PHYSICIAN ASSISTANT

## 2023-05-06 PROCEDURE — 85025 COMPLETE CBC W/AUTO DIFF WBC: CPT | Performed by: PHYSICIAN ASSISTANT

## 2023-05-06 PROCEDURE — 99285 EMERGENCY DEPT VISIT HI MDM: CPT | Mod: 25

## 2023-05-06 PROCEDURE — 74177 CT ABD & PELVIS W/CONTRAST: CPT

## 2023-05-06 PROCEDURE — 80048 BASIC METABOLIC PNL TOTAL CA: CPT | Performed by: PHYSICIAN ASSISTANT

## 2023-05-06 PROCEDURE — 10060 I&D ABSCESS SIMPLE/SINGLE: CPT

## 2023-05-06 PROCEDURE — 250N000009 HC RX 250: Performed by: PHYSICIAN ASSISTANT

## 2023-05-06 PROCEDURE — 36415 COLL VENOUS BLD VENIPUNCTURE: CPT | Performed by: PHYSICIAN ASSISTANT

## 2023-05-06 PROCEDURE — 250N000013 HC RX MED GY IP 250 OP 250 PS 637: Performed by: PHYSICIAN ASSISTANT

## 2023-05-06 RX ORDER — ACETAMINOPHEN 325 MG/1
975 TABLET ORAL ONCE
Status: COMPLETED | OUTPATIENT
Start: 2023-05-06 | End: 2023-05-06

## 2023-05-06 RX ORDER — IOPAMIDOL 755 MG/ML
119 INJECTION, SOLUTION INTRAVASCULAR ONCE
Status: COMPLETED | OUTPATIENT
Start: 2023-05-06 | End: 2023-05-06

## 2023-05-06 RX ADMIN — IOPAMIDOL 119 ML: 755 INJECTION, SOLUTION INTRAVENOUS at 16:26

## 2023-05-06 RX ADMIN — ACETAMINOPHEN 975 MG: 325 TABLET ORAL at 16:07

## 2023-05-06 RX ADMIN — SODIUM CHLORIDE 75 ML: 9 INJECTION, SOLUTION INTRAVENOUS at 16:26

## 2023-05-06 ASSESSMENT — ACTIVITIES OF DAILY LIVING (ADL)
ADLS_ACUITY_SCORE: 35
ADLS_ACUITY_SCORE: 33

## 2023-05-06 NOTE — ED TRIAGE NOTES
Pt presents with concern for perianal abscess- reports he was seen at  and told to come to the ED as it may be too large for UC to manage. Pt reports hx of perianal abscess that drained on its own. Reports pain for the past few days, worse today. Denies fever/chills.      Triage Assessment     Row Name 05/06/23 2905       Triage Assessment (Adult)    Airway WDL WDL       Respiratory WDL    Respiratory WDL WDL       Skin Circulation/Temperature WDL    Skin Circulation/Temperature WDL WDL       Cardiac WDL    Cardiac WDL WDL       Peripheral/Neurovascular WDL    Peripheral Neurovascular WDL WDL       Cognitive/Neuro/Behavioral WDL    Cognitive/Neuro/Behavioral WDL WDL

## 2023-05-06 NOTE — DISCHARGE INSTRUCTIONS
-The wound may drain for the first several days. Cover the wound with a clean dry bandage. Change the dressing if it becomes soaked with blood or pus, or soiled with feces. Use the dressing changing method described by your healthcare provider.  -Try sitz baths. Sit in a tub filled with about 6 inches of hot water for 15 to 30 minutes. Test the water temperature before sitting down to make sure it will not burn you. Repeat this twice a day until pain eases.    Use Tylenol and ibuprofen for pain.

## 2023-05-06 NOTE — ED PROVIDER NOTES
History     Chief Complaint:  Rectal/perineal Pain       HPI   Noam Eugene is a 32 year old male with a history of ulcerative colitis who presents to the ED for evaluation of rectal pain.  Patient notes onset of rectal pain that began 2 days ago.  Pain significantly increased yesterday evening and he is having difficulty sitting at this time.  He has not taken anything for his symptoms.  He does note feeling chilled, however no measurable fever.  He denies pain elsewhere.  He notes when he had something similar about 10 years ago it spontaneously drained.  No black or bloody stool.    Independent Historian:   None - Patient Only    Review of External Notes:   UC visit today:  This is a 32 M patient of [facility] who is seen today for pain on the rectal area in past 2 days.   He has history corona ulcerative colitis and has had one previous abscess that drained on its own.   He is not aware of fever.   On exam , VSS.   The rectal exam showed area of redness and induration about 8 inches diameter and there is increased elevation and tenderness of area about 1.5 inches diameter adjacent to the rectum.     He will need ER visit for treatment of this condition and we suggest his own system , [facility] would be best and he says he will go to the ER at [facility] now.      Gastroenterology visit on 12/3/2018  ASSESSMENT/PLAN  This is a 28-year-old male with presumed ulcerative pancolitis, with recent endoscopic assessment and histologic activity concerning for Crohn's and development of antibodies to infliximab:     1.  Presumed ulcerative pancolitis, with recent endoscopic assessment and histologic activity concerning for Crohn's: Unfortunately patient developed antibodies to infliximab.  Clinically patient is stable and her most recent endoscopic assessment is consistent with mild disease.  Given patient has done so well in the setting of active high level of antibodies, we will proceed with a trial of 5 ASA (Lialda) and then  endoscopically assess if we are able to achieve mucosal healing.  We will also obtain a fecal calprotectin, however patient is currently on prednisone, so this may skew the results.  If we were to need to escalate therapy, adalimumab would be a good option given he did respond to infliximab in the past.  -- Continue prednisone taper for now, call with any return of symptoms (increased frequency of stool, blood in stool, abdominal pain)  -- Start Lialda 4 pills per day  -- Stool sample ASAP (fecal calprotectin)  -- Labs at next visit  -- Likely a flex sig depending on clinical response to 5ASA or a need to escalate therapy.    ROS:  Review of Systems  ROS neg other than the symptoms noted above in the HPI.    Allergies:  No Known Allergies     Medications:    No current outpatient medications on file.      Past Medical History:    Past Medical History:   Diagnosis Date     Ulcerative colitis (H)        Past Surgical History:    Past Surgical History:   Procedure Laterality Date     COLONOSCOPY  7/10/2013    Procedure: COMBINED COLONOSCOPY, SINGLE BIOPSY/POLYPECTOMY BY BIOPSY;;  Surgeon: Julien Stewart MD;  Location: [facility] OR     COLONOSCOPY N/A 2/15/2019    Procedure: COMBINED COLONOSCOPY, SINGLE OR MULTIPLE BIOPSY/POLYPECTOMY BY BIOPSY;  Surgeon: Tori Luque MD;  Location:  GI     ESOPHAGOSCOPY, GASTROSCOPY, DUODENOSCOPY (EGD), COMBINED  7/10/2013    Procedure: COMBINED ESOPHAGOSCOPY, GASTROSCOPY, DUODENOSCOPY (EGD), BIOPSY SINGLE OR MULTIPLE;  Colonoscopy, EGD, ulcerative colitis;  Surgeon: Julien Stewart MD;  Location: [facility] OR        Family History:    family history includes Hypertension in his father.    Social History:   reports that he has never smoked. He has never used smokeless tobacco. He reports current alcohol use. He reports that he does not use drugs.  PCP: Monica Pop     Physical Exam     Patient Vitals for the past 24 hrs:   BP Temp Pulse Resp SpO2 Height Weight   05/06/23 1301 135/62 98  F  "(36.7  C) 94 20 97 % 2.007 m (6' 7\") 106.6 kg (235 lb)        Physical Exam  Constitutional: Pleasant. Cooperative.   Eyes: Pupils equally round and reactive  HENT: Head is normal in appearance. Oropharynx is normal with moist mucus membranes.  Cardiovascular: Regular rate and rhythm and without murmurs.  Respiratory: Normal respiratory effort, lungs are clear bilaterally.  GI: Abdomen is soft, non-tender, non-distended. No guarding, rebound, or rigidity.  Musculoskeletal: No asymmetry of the lower extremities.  Skin: Normal, without rash.  Neurologic: Cranial nerves grossly intact, normal cognition, no focal deficits. Alert and oriented x 3.   Psychiatric: Normal affect.  Rectal (Chaperoned)   Normal tone   No gross blood   No colored stool noted   No external hemorrhoids noted   No anal fissures noted   Large area of erythema with central pustule noted to 2 o'clock around rectum  Nursing notes and vital signs reviewed.    Emergency Department Course     Imaging:  CT Abdomen Pelvis w Contrast   Final Result   IMPRESSION:    1.  There is a left perianal abscess extending to the left perineum and left gluteal fold measuring approximately 3 cm in size. No intrapelvic extension. No additional abscess present.         Report per radiology    Laboratory:  Labs Ordered and Resulted from Time of ED Arrival to Time of ED Departure   BASIC METABOLIC PANEL - Abnormal       Result Value    Sodium 136      Potassium 4.0      Chloride 97 (*)     Carbon Dioxide (CO2) 28      Anion Gap 11      Urea Nitrogen 10.1      Creatinine 1.08      Calcium 9.4      Glucose 105 (*)     GFR Estimate >90     CBC WITH PLATELETS AND DIFFERENTIAL - Abnormal    WBC Count 15.2 (*)     RBC Count 4.73      Hemoglobin 15.0      Hematocrit 43.6      MCV 92      MCH 31.7      MCHC 34.4      RDW 12.2      Platelet Count 171      % Neutrophils 86      % Lymphocytes 6      % Monocytes 8      % Eosinophils 0      % Basophils 0      % Immature Granulocytes 0  "     NRBCs per 100 WBC 0      Absolute Neutrophils 13.0 (*)     Absolute Lymphocytes 0.9      Absolute Monocytes 1.2      Absolute Eosinophils 0.0      Absolute Basophils 0.0      Absolute Immature Granulocytes 0.1      Absolute NRBCs 0.0          Procedures       Procedure: Incision and Drainage     LOCATIONS:  perirectal    ANESTHESIA:  Local field block using Marcaine 0.5% plain    PREPARATION:  Cleansed with Natalya Cottrellns    PROCEDURE:  Area was incised with # 11 Blade ([place]) with a Single Straight incision.  Wound treatment included Purulent Drainage.  Packing consisted of No Packing.  Appropriate dressing was applied to cover the area.    Patient Status: Patient tolerated the procedure well. There were no complications.    Emergency Department Course & Assessments:    Interventions:  Medications   acetaminophen (TYLENOL) tablet 975 mg (975 mg Oral $Given 5/6/23 1607)   iopamidol (ISOVUE-370) solution 119 mL (119 mLs Intravenous $Given 5/6/23 1626)   sodium chloride 0.9 % bag 500mL for CT scan flush use (75 mLs Intravenous $Given 5/6/23 1626)      Independent Interpretation (X-rays, CTs, rhythm strip):  None    Consultations/Discussion of Management or Tests:  None     Social Determinants of Health affecting care:   None    Disposition:  The patient was discharged to home.     Impression & Plan      Medical Decision Making:  Noam Eugene is a 32 year old male with a history of ulcerative colitis not currently on medications who presents to the ED for evaluation of rectal pain.  See HPI as above for additional details.  Vitals and physical exam as above.  Given history of inflammatory bowel disease, concern for possible complication including deep space abscess and/or fistula.  Work-up obtained as above without suggestion for complication beyond superficial abscess.  I&D performed as above with improvement of patient's symptoms.  Did discuss initiating antibiotics, however patient would like to defer  given his GI history.  I felt that this was reasonable.  He should follow-up with GI.  [place] patient was safe for discharge to home. Discussed reasons to return. All questions answered. Patient discharged to home in stable condition.    Diagnosis:    ICD-10-CM    1. Perianal abscess  K61.0            Discharge Medications:  There are no discharge medications for this patient.     This record was created at least in part using electronic voice recognition software, so please excuse any typographical errors.       Sanjeev De La Garza PA-C  05/06/23 2135

## 2023-12-22 ENCOUNTER — HOSPITAL ENCOUNTER (EMERGENCY)
Facility: CLINIC | Age: 33
Discharge: HOME | End: 2023-12-22
Payer: COMMERCIAL

## 2024-01-22 ENCOUNTER — LAB (OUTPATIENT)
Dept: LAB | Facility: CLINIC | Age: 34
End: 2024-01-22
Payer: COMMERCIAL

## 2024-01-22 DIAGNOSIS — Z31.440 ENCOUNTER OF MALE FOR TESTING FOR GENETIC DISEASE CARRIER STATUS FOR PROCREATIVE MANAGEMENT: Primary | ICD-10-CM

## 2024-01-22 DIAGNOSIS — Z31.440 ENCOUNTER OF MALE FOR TESTING FOR GENETIC DISEASE CARRIER STATUS FOR PROCREATIVE MANAGEMENT: ICD-10-CM

## 2024-01-22 LAB
E AG RBC QL: POSITIVE
LITTLE E AG RBC QL: POSITIVE
SPECIMEN EXPIRATION DATE: NORMAL

## 2024-01-22 PROCEDURE — 36415 COLL VENOUS BLD VENIPUNCTURE: CPT

## 2024-01-22 PROCEDURE — 86905 BLOOD TYPING RBC ANTIGENS: CPT

## 2024-01-22 NOTE — TELEPHONE ENCOUNTER
January 22, 2024     I called Maxine and spoke to her about the blood typing results for her partner, Manny. This was per Manny's request in their genetic counseling appointment. He is heterozygous for E antigen. 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 There is a 50% chance of their pregnancy having the genotype Ee and a 50% of their pregnancy having the genotype ee.     Mamta Alves MS, [facility]  Licensed Genetic Counselor  [facility]  Pager: [phone]  Office: [phone]

## 2024-01-22 NOTE — PROGRESS NOTES
2024     Manny had his blood drawn for blood typing for E and e antigen to assess for risk for hemolytic disease of the  for his partner's pregnancy due to her E-antibodies. He requested that his results will be called out to his partner once available.     Results will be available in [employer] for review.     Mamta Alves MS, [facility]  Licensed Genetic Counselor  [facility]  Pager: [phone]  Office: [phone]

## 2024-05-26 ENCOUNTER — HEALTH MAINTENANCE LETTER (OUTPATIENT)
Age: 34
End: 2024-05-26

## 2025-06-14 ENCOUNTER — HEALTH MAINTENANCE LETTER (OUTPATIENT)
Age: 35
End: 2025-06-14

## (undated) RX ORDER — SIMETHICONE 20 MG/.3ML
EMULSION ORAL
Status: DISPENSED
Start: 2019-02-15

## (undated) RX ORDER — FENTANYL CITRATE 50 UG/ML
INJECTION, SOLUTION INTRAMUSCULAR; INTRAVENOUS
Status: DISPENSED
Start: 2019-02-15